# Patient Record
Sex: FEMALE | Race: WHITE | NOT HISPANIC OR LATINO | Employment: UNEMPLOYED | URBAN - METROPOLITAN AREA
[De-identification: names, ages, dates, MRNs, and addresses within clinical notes are randomized per-mention and may not be internally consistent; named-entity substitution may affect disease eponyms.]

---

## 2017-01-16 ENCOUNTER — TELEPHONE (OUTPATIENT)
Dept: URGENT CARE | Facility: CLINIC | Age: 77
End: 2017-01-16

## 2019-07-01 ENCOUNTER — LAB REQUISITION (OUTPATIENT)
Dept: LAB | Facility: HOSPITAL | Age: 79
End: 2019-07-01

## 2019-07-01 DIAGNOSIS — Z00.00 ROUTINE GENERAL MEDICAL EXAMINATION AT A HEALTH CARE FACILITY: ICD-10-CM

## 2019-07-01 LAB
INR PPP: 2.71 (ref 0.9–1.1)
PROTHROMBIN TIME: 28.5 SECONDS (ref 11.7–14.2)

## 2019-07-01 PROCEDURE — 85610 PROTHROMBIN TIME: CPT

## 2022-02-08 ENCOUNTER — LAB REQUISITION (OUTPATIENT)
Dept: LAB | Facility: HOSPITAL | Age: 82
End: 2022-02-08

## 2022-02-08 DIAGNOSIS — J96.10 CHRONIC RESPIRATORY FAILURE, UNSPECIFIED WHETHER WITH HYPOXIA OR HYPERCAPNIA: ICD-10-CM

## 2022-02-08 DIAGNOSIS — I11.0 HYPERTENSIVE HEART DISEASE WITH HEART FAILURE: ICD-10-CM

## 2022-02-08 DIAGNOSIS — J44.1 CHRONIC OBSTRUCTIVE PULMONARY DISEASE WITH (ACUTE) EXACERBATION: ICD-10-CM

## 2022-02-08 DIAGNOSIS — I50.9 HEART FAILURE, UNSPECIFIED: ICD-10-CM

## 2022-02-08 LAB
ANION GAP SERPL CALCULATED.3IONS-SCNC: 13.4 MMOL/L (ref 5–15)
BASOPHILS # BLD AUTO: 0.04 10*3/MM3 (ref 0–0.2)
BASOPHILS NFR BLD AUTO: 0.6 % (ref 0–1.5)
BUN SERPL-MCNC: 36 MG/DL (ref 8–23)
BUN/CREAT SERPL: 16.7 (ref 7–25)
CALCIUM SPEC-SCNC: 9.6 MG/DL (ref 8.6–10.5)
CHLORIDE SERPL-SCNC: 100 MMOL/L (ref 98–107)
CO2 SERPL-SCNC: 18.6 MMOL/L (ref 22–29)
CREAT SERPL-MCNC: 2.15 MG/DL (ref 0.57–1)
DEPRECATED RDW RBC AUTO: 46.1 FL (ref 37–54)
EOSINOPHIL # BLD AUTO: 0.36 10*3/MM3 (ref 0–0.4)
EOSINOPHIL NFR BLD AUTO: 5.3 % (ref 0.3–6.2)
ERYTHROCYTE [DISTWIDTH] IN BLOOD BY AUTOMATED COUNT: 13.6 % (ref 12.3–15.4)
GFR SERPL CREATININE-BSD FRML MDRD: 22 ML/MIN/1.73
GLUCOSE SERPL-MCNC: 93 MG/DL (ref 65–99)
HCT VFR BLD AUTO: 28.4 % (ref 34–46.6)
HGB BLD-MCNC: 9.1 G/DL (ref 12–15.9)
IMM GRANULOCYTES # BLD AUTO: 0.09 10*3/MM3 (ref 0–0.05)
IMM GRANULOCYTES NFR BLD AUTO: 1.3 % (ref 0–0.5)
LYMPHOCYTES # BLD AUTO: 1.63 10*3/MM3 (ref 0.7–3.1)
LYMPHOCYTES NFR BLD AUTO: 23.9 % (ref 19.6–45.3)
MCH RBC QN AUTO: 30.1 PG (ref 26.6–33)
MCHC RBC AUTO-ENTMCNC: 32 G/DL (ref 31.5–35.7)
MCV RBC AUTO: 94 FL (ref 79–97)
MONOCYTES # BLD AUTO: 0.79 10*3/MM3 (ref 0.1–0.9)
MONOCYTES NFR BLD AUTO: 11.6 % (ref 5–12)
NEUTROPHILS NFR BLD AUTO: 3.91 10*3/MM3 (ref 1.7–7)
NEUTROPHILS NFR BLD AUTO: 57.3 % (ref 42.7–76)
NRBC BLD AUTO-RTO: 0 /100 WBC (ref 0–0.2)
PLATELET # BLD AUTO: 357 10*3/MM3 (ref 140–450)
PMV BLD AUTO: 9.4 FL (ref 6–12)
POTASSIUM SERPL-SCNC: 5.2 MMOL/L (ref 3.5–5.2)
RBC # BLD AUTO: 3.02 10*6/MM3 (ref 3.77–5.28)
SODIUM SERPL-SCNC: 132 MMOL/L (ref 136–145)
WBC NRBC COR # BLD: 6.82 10*3/MM3 (ref 3.4–10.8)

## 2022-02-08 PROCEDURE — 85025 COMPLETE CBC W/AUTO DIFF WBC: CPT | Performed by: FAMILY MEDICINE

## 2022-02-08 PROCEDURE — 80048 BASIC METABOLIC PNL TOTAL CA: CPT | Performed by: FAMILY MEDICINE

## 2024-03-14 ENCOUNTER — LAB REQUISITION (OUTPATIENT)
Dept: LAB | Facility: HOSPITAL | Age: 84
End: 2024-03-14
Payer: MEDICARE

## 2024-03-14 DIAGNOSIS — I13.0 HYPERTENSIVE HEART AND CHRONIC KIDNEY DISEASE WITH HEART FAILURE AND STAGE 1 THROUGH STAGE 4 CHRONIC KIDNEY DISEASE, OR UNSPECIFIED CHRONIC KIDNEY DISEASE: ICD-10-CM

## 2024-03-14 DIAGNOSIS — J44.9 CHRONIC OBSTRUCTIVE PULMONARY DISEASE, UNSPECIFIED: ICD-10-CM

## 2024-03-14 DIAGNOSIS — D63.1 ANEMIA IN CHRONIC KIDNEY DISEASE (CODE): ICD-10-CM

## 2024-03-14 DIAGNOSIS — I50.42 CHRONIC COMBINED SYSTOLIC (CONGESTIVE) AND DIASTOLIC (CONGESTIVE) HEART FAILURE: ICD-10-CM

## 2024-03-14 DIAGNOSIS — R32 UNSPECIFIED URINARY INCONTINENCE: ICD-10-CM

## 2024-03-14 DIAGNOSIS — N39.0 URINARY TRACT INFECTION, SITE NOT SPECIFIED: ICD-10-CM

## 2024-03-14 LAB
BACTERIA UR QL AUTO: ABNORMAL /HPF
BILIRUB UR QL STRIP: NEGATIVE
CLARITY UR: CLEAR
COLOR UR: YELLOW
GLUCOSE UR STRIP-MCNC: NEGATIVE MG/DL
HGB UR QL STRIP.AUTO: NEGATIVE
HYALINE CASTS UR QL AUTO: ABNORMAL /LPF
KETONES UR QL STRIP: NEGATIVE
LEUKOCYTE ESTERASE UR QL STRIP.AUTO: NEGATIVE
NITRITE UR QL STRIP: NEGATIVE
PH UR STRIP.AUTO: 6 [PH] (ref 4.5–8)
PROT UR QL STRIP: ABNORMAL
RBC # UR STRIP: ABNORMAL /HPF
REF LAB TEST METHOD: ABNORMAL
SP GR UR STRIP: 1.02 (ref 1–1.03)
SQUAMOUS #/AREA URNS HPF: ABNORMAL /HPF
UROBILINOGEN UR QL STRIP: ABNORMAL
WBC # UR STRIP: ABNORMAL /HPF

## 2024-03-14 PROCEDURE — 81001 URINALYSIS AUTO W/SCOPE: CPT | Performed by: FAMILY MEDICINE

## 2024-08-16 ENCOUNTER — APPOINTMENT (OUTPATIENT)
Dept: NUCLEAR MEDICINE | Facility: HOSPITAL | Age: 84
DRG: 871 | End: 2024-08-16
Payer: MEDICARE

## 2024-08-16 ENCOUNTER — HOSPITAL ENCOUNTER (INPATIENT)
Facility: HOSPITAL | Age: 84
LOS: 4 days | Discharge: NURSING, REHAB OR LONG-TERM CARE | DRG: 871 | End: 2024-08-20
Attending: EMERGENCY MEDICINE | Admitting: FAMILY MEDICINE
Payer: MEDICARE

## 2024-08-16 ENCOUNTER — APPOINTMENT (OUTPATIENT)
Dept: GENERAL RADIOLOGY | Facility: HOSPITAL | Age: 84
DRG: 871 | End: 2024-08-16
Payer: MEDICARE

## 2024-08-16 DIAGNOSIS — I50.23 ACUTE ON CHRONIC SYSTOLIC CONGESTIVE HEART FAILURE: ICD-10-CM

## 2024-08-16 DIAGNOSIS — J96.02 ACUTE RESPIRATORY FAILURE WITH HYPOXIA AND HYPERCAPNIA: Primary | ICD-10-CM

## 2024-08-16 DIAGNOSIS — J18.9 HAP (HOSPITAL-ACQUIRED PNEUMONIA): ICD-10-CM
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
## 2024-08-16 DIAGNOSIS — J96.01 ACUTE RESPIRATORY FAILURE WITH HYPOXIA AND HYPERCAPNIA: Primary | ICD-10-CM

## 2024-08-16 DIAGNOSIS — J44.1 COPD EXACERBATION: ICD-10-CM

## 2024-08-16 PROBLEM — J96.90 RESPIRATORY FAILURE: Status: ACTIVE | Noted: 2024-08-16

## 2024-08-16 LAB
ALBUMIN SERPL-MCNC: 3.5 G/DL (ref 3.5–5.2)
ALBUMIN/GLOB SERPL: 1.2 G/DL
ALP SERPL-CCNC: 180 U/L (ref 39–117)
ALT SERPL W P-5'-P-CCNC: 18 U/L (ref 1–33)
ANION GAP SERPL CALCULATED.3IONS-SCNC: 8.7 MMOL/L (ref 5–15)
ARTERIAL PATENCY WRIST A: ABNORMAL
ARTERIAL PATENCY WRIST A: POSITIVE
AST SERPL-CCNC: 21 U/L (ref 1–32)
ATMOSPHERIC PRESS: 740 MMHG
ATMOSPHERIC PRESS: 740 MMHG
B PARAPERT DNA SPEC QL NAA+PROBE: NOT DETECTED
B PERT DNA SPEC QL NAA+PROBE: NOT DETECTED
BACTERIA UR QL AUTO: ABNORMAL /HPF
BASE EXCESS BLDA CALC-SCNC: -1.4 MMOL/L (ref 0–2)
BASE EXCESS BLDA CALC-SCNC: -4.6 MMOL/L (ref 0–2)
BASOPHILS # BLD AUTO: 0.1 10*3/MM3 (ref 0–0.2)
BASOPHILS NFR BLD AUTO: 0.6 % (ref 0–1.5)
BDY SITE: ABNORMAL
BDY SITE: ABNORMAL
BILIRUB SERPL-MCNC: 0.3 MG/DL (ref 0–1.2)
BILIRUB UR QL STRIP: NEGATIVE
BODY TEMPERATURE: 37
BODY TEMPERATURE: 37
BUN SERPL-MCNC: 27 MG/DL (ref 8–23)
BUN/CREAT SERPL: 21.8 (ref 7–25)
C PNEUM DNA NPH QL NAA+NON-PROBE: NOT DETECTED
CALCIUM SPEC-SCNC: 9 MG/DL (ref 8.6–10.5)
CHLORIDE SERPL-SCNC: 107 MMOL/L (ref 98–107)
CLARITY UR: CLEAR
CO2 SERPL-SCNC: 23.3 MMOL/L (ref 22–29)
COLOR UR: YELLOW
CREAT SERPL-MCNC: 1.24 MG/DL (ref 0.57–1)
D DIMER PPP FEU-MCNC: 1.83 MCGFEU/ML (ref 0–0.85)
D-LACTATE SERPL-SCNC: 0.7 MMOL/L (ref 0.5–2)
DEPRECATED RDW RBC AUTO: 65.7 FL (ref 37–54)
EGFRCR SERPLBLD CKD-EPI 2021: 42.7 ML/MIN/1.73
EOSINOPHIL # BLD AUTO: 0.8 10*3/MM3 (ref 0–0.4)
EOSINOPHIL NFR BLD AUTO: 4.6 % (ref 0.3–6.2)
EPAP: 6
ERYTHROCYTE [DISTWIDTH] IN BLOOD BY AUTOMATED COUNT: 17.8 % (ref 12.3–15.4)
FINE GRAN CASTS URNS QL MICRO: ABNORMAL /LPF
FLUAV RNA RESP QL NAA+PROBE: NOT DETECTED
FLUAV SUBTYP SPEC NAA+PROBE: NOT DETECTED
FLUBV RNA ISLT QL NAA+PROBE: NOT DETECTED
FLUBV RNA RESP QL NAA+PROBE: NOT DETECTED
GAS FLOW AIRWAY: 6 LPM
GEN 5 2HR TROPONIN T REFLEX: 120 NG/L
GLOBULIN UR ELPH-MCNC: 3 GM/DL
GLUCOSE SERPL-MCNC: 193 MG/DL (ref 65–99)
GLUCOSE UR STRIP-MCNC: NEGATIVE MG/DL
HADV DNA SPEC NAA+PROBE: NOT DETECTED
HCO3 BLDA-SCNC: 23.6 MMOL/L (ref 20–26)
HCO3 BLDA-SCNC: 24.9 MMOL/L (ref 20–26)
HCOV 229E RNA SPEC QL NAA+PROBE: NOT DETECTED
HCOV HKU1 RNA SPEC QL NAA+PROBE: NOT DETECTED
HCOV NL63 RNA SPEC QL NAA+PROBE: NOT DETECTED
HCOV OC43 RNA SPEC QL NAA+PROBE: NOT DETECTED
HCT VFR BLD AUTO: 34.3 % (ref 34–46.6)
HGB BLD-MCNC: 10.5 G/DL (ref 12–15.9)
HGB BLDA-MCNC: 10.7 G/DL (ref 13.5–17.5)
HGB BLDA-MCNC: 9.7 G/DL (ref 13.5–17.5)
HGB UR QL STRIP.AUTO: ABNORMAL
HMPV RNA NPH QL NAA+NON-PROBE: NOT DETECTED
HPIV1 RNA ISLT QL NAA+PROBE: NOT DETECTED
HPIV2 RNA SPEC QL NAA+PROBE: NOT DETECTED
HPIV3 RNA NPH QL NAA+PROBE: NOT DETECTED
HPIV4 P GENE NPH QL NAA+PROBE: NOT DETECTED
HYALINE CASTS UR QL AUTO: ABNORMAL /LPF
IMM GRANULOCYTES # BLD AUTO: 0.13 10*3/MM3 (ref 0–0.05)
IMM GRANULOCYTES NFR BLD AUTO: 0.8 % (ref 0–0.5)
INHALED O2 CONCENTRATION: 40 %
INR PPP: 1.17 (ref 0.9–1.1)
IPAP: 12
KETONES UR QL STRIP: NEGATIVE
LEUKOCYTE ESTERASE UR QL STRIP.AUTO: NEGATIVE
LYMPHOCYTES # BLD AUTO: 3.11 10*3/MM3 (ref 0.7–3.1)
LYMPHOCYTES NFR BLD AUTO: 18 % (ref 19.6–45.3)
Lab: ABNORMAL
Lab: ABNORMAL
M PNEUMO IGG SER IA-ACNC: NOT DETECTED
MCH RBC QN AUTO: 30.6 PG (ref 26.6–33)
MCHC RBC AUTO-ENTMCNC: 30.6 G/DL (ref 31.5–35.7)
MCV RBC AUTO: 100 FL (ref 79–97)
MODALITY: ABNORMAL
MODALITY: ABNORMAL
MONOCYTES # BLD AUTO: 1.34 10*3/MM3 (ref 0.1–0.9)
MONOCYTES NFR BLD AUTO: 7.7 % (ref 5–12)
NEUTROPHILS NFR BLD AUTO: 11.83 10*3/MM3 (ref 1.7–7)
NEUTROPHILS NFR BLD AUTO: 68.3 % (ref 42.7–76)
NITRITE UR QL STRIP: NEGATIVE
NOTIFIED BY: ABNORMAL
NRBC BLD AUTO-RTO: 0 /100 WBC (ref 0–0.2)
NT-PROBNP SERPL-MCNC: 2624 PG/ML (ref 0–1800)
PCO2 BLDA: 39.4 MM HG (ref 35–45)
PCO2 BLDA: 70 MM HG (ref 35–45)
PCO2 TEMP ADJ BLD: 39.4 MM HG (ref 35–45)
PCO2 TEMP ADJ BLD: 70 MM HG (ref 35–45)
PH BLDA: 7.16 PH UNITS (ref 7.35–7.45)
PH BLDA: 7.38 PH UNITS (ref 7.35–7.45)
PH UR STRIP.AUTO: 5.5 [PH] (ref 4.5–8)
PH, TEMP CORRECTED: 7.16 PH UNITS (ref 7.35–7.45)
PH, TEMP CORRECTED: 7.38 PH UNITS (ref 7.35–7.45)
PLATELET # BLD AUTO: 260 10*3/MM3 (ref 140–450)
PMV BLD AUTO: 9.4 FL (ref 6–12)
PO2 BLD: 305 MM[HG] (ref 0–500)
PO2 BLDA: 107 MM HG (ref 83–108)
PO2 BLDA: 122 MM HG (ref 83–108)
PO2 TEMP ADJ BLD: 107 MM HG (ref 83–108)
PO2 TEMP ADJ BLD: 122 MM HG (ref 83–108)
POTASSIUM SERPL-SCNC: 4.3 MMOL/L (ref 3.5–5.2)
PROCALCITONIN SERPL-MCNC: 0.22 NG/ML (ref 0–0.25)
PROT SERPL-MCNC: 6.5 G/DL (ref 6–8.5)
PROT UR QL STRIP: ABNORMAL
PROTHROMBIN TIME: 15.4 SECONDS (ref 12.1–15)
QT INTERVAL: 343 MS
QTC INTERVAL: 465 MS
RBC # BLD AUTO: 3.43 10*6/MM3 (ref 3.77–5.28)
RBC # UR STRIP: ABNORMAL /HPF
REF LAB TEST METHOD: ABNORMAL
RHINOVIRUS RNA SPEC NAA+PROBE: NOT DETECTED
RSV RNA NPH QL NAA+NON-PROBE: NOT DETECTED
SAO2 % BLDCOA: 97.4 % (ref 94–99)
SAO2 % BLDCOA: 99.5 % (ref 94–99)
SARS-COV-2 RNA NPH QL NAA+NON-PROBE: NOT DETECTED
SARS-COV-2 RNA RESP QL NAA+PROBE: NOT DETECTED
SET MECH RESP RATE: 14
SODIUM SERPL-SCNC: 139 MMOL/L (ref 136–145)
SP GR UR STRIP: 1.02 (ref 1–1.03)
SQUAMOUS #/AREA URNS HPF: ABNORMAL /HPF
TROPONIN T DELTA: 52 NG/L
TROPONIN T SERPL HS-MCNC: 68 NG/L
UROBILINOGEN UR QL STRIP: ABNORMAL
VENTILATOR MODE: ABNORMAL
WBC # UR STRIP: ABNORMAL /HPF
WBC NRBC COR # BLD AUTO: 17.31 10*3/MM3 (ref 3.4–10.8)

## 2024-08-16 PROCEDURE — 78580 LUNG PERFUSION IMAGING: CPT

## 2024-08-16 PROCEDURE — 25010000002 FUROSEMIDE PER 20 MG: Performed by: EMERGENCY MEDICINE

## 2024-08-16 PROCEDURE — 83880 ASSAY OF NATRIURETIC PEPTIDE: CPT | Performed by: EMERGENCY MEDICINE

## 2024-08-16 PROCEDURE — 85025 COMPLETE CBC W/AUTO DIFF WBC: CPT | Performed by: EMERGENCY MEDICINE

## 2024-08-16 PROCEDURE — 25010000002 PIPERACILLIN SOD-TAZOBACTAM PER 1 G: Performed by: EMERGENCY MEDICINE

## 2024-08-16 PROCEDURE — 25810000003 SODIUM CHLORIDE 0.9 % SOLUTION 1,000 ML FLEX CONT: Performed by: INTERNAL MEDICINE

## 2024-08-16 PROCEDURE — 94640 AIRWAY INHALATION TREATMENT: CPT

## 2024-08-16 PROCEDURE — 85379 FIBRIN DEGRADATION QUANT: CPT | Performed by: EMERGENCY MEDICINE

## 2024-08-16 PROCEDURE — 94799 UNLISTED PULMONARY SVC/PX: CPT

## 2024-08-16 PROCEDURE — 94761 N-INVAS EAR/PLS OXIMETRY MLT: CPT

## 2024-08-16 PROCEDURE — 80053 COMPREHEN METABOLIC PANEL: CPT | Performed by: EMERGENCY MEDICINE

## 2024-08-16 PROCEDURE — 25010000002 METHYLPREDNISOLONE PER 125 MG: Performed by: EMERGENCY MEDICINE

## 2024-08-16 PROCEDURE — 36600 WITHDRAWAL OF ARTERIAL BLOOD: CPT

## 2024-08-16 PROCEDURE — 94660 CPAP INITIATION&MGMT: CPT

## 2024-08-16 PROCEDURE — 87040 BLOOD CULTURE FOR BACTERIA: CPT | Performed by: EMERGENCY MEDICINE

## 2024-08-16 PROCEDURE — 81001 URINALYSIS AUTO W/SCOPE: CPT | Performed by: EMERGENCY MEDICINE

## 2024-08-16 PROCEDURE — 83605 ASSAY OF LACTIC ACID: CPT | Performed by: EMERGENCY MEDICINE

## 2024-08-16 PROCEDURE — 99223 1ST HOSP IP/OBS HIGH 75: CPT | Performed by: FAMILY MEDICINE

## 2024-08-16 PROCEDURE — 84145 PROCALCITONIN (PCT): CPT | Performed by: EMERGENCY MEDICINE

## 2024-08-16 PROCEDURE — 25010000002 PIPERACILLIN SOD-TAZOBACTAM PER 1 G: Performed by: INTERNAL MEDICINE

## 2024-08-16 PROCEDURE — 99291 CRITICAL CARE FIRST HOUR: CPT

## 2024-08-16 PROCEDURE — 84484 ASSAY OF TROPONIN QUANT: CPT | Performed by: EMERGENCY MEDICINE

## 2024-08-16 PROCEDURE — 36415 COLL VENOUS BLD VENIPUNCTURE: CPT

## 2024-08-16 PROCEDURE — 82803 BLOOD GASES ANY COMBINATION: CPT

## 2024-08-16 PROCEDURE — 93005 ELECTROCARDIOGRAM TRACING: CPT | Performed by: EMERGENCY MEDICINE

## 2024-08-16 PROCEDURE — 25010000002 VANCOMYCIN 750 MG RECONSTITUTED SOLUTION 1 EACH VIAL: Performed by: EMERGENCY MEDICINE

## 2024-08-16 PROCEDURE — 85610 PROTHROMBIN TIME: CPT | Performed by: EMERGENCY MEDICINE

## 2024-08-16 PROCEDURE — A9540 TC99M MAA: HCPCS | Performed by: FAMILY MEDICINE

## 2024-08-16 PROCEDURE — 0 TECHNETIUM ALBUMIN AGGREGATED: Performed by: FAMILY MEDICINE

## 2024-08-16 PROCEDURE — 93010 ELECTROCARDIOGRAM REPORT: CPT | Performed by: INTERNAL MEDICINE

## 2024-08-16 PROCEDURE — 87636 SARSCOV2 & INF A&B AMP PRB: CPT | Performed by: EMERGENCY MEDICINE

## 2024-08-16 PROCEDURE — 71045 X-RAY EXAM CHEST 1 VIEW: CPT

## 2024-08-16 PROCEDURE — 0202U NFCT DS 22 TRGT SARS-COV-2: CPT | Performed by: INTERNAL MEDICINE

## 2024-08-16 PROCEDURE — 25810000003 SODIUM CHLORIDE 0.9 % SOLUTION 250 ML FLEX CONT: Performed by: EMERGENCY MEDICINE

## 2024-08-16 PROCEDURE — 99285 EMERGENCY DEPT VISIT HI MDM: CPT

## 2024-08-16 PROCEDURE — 94664 DEMO&/EVAL PT USE INHALER: CPT

## 2024-08-16 RX ORDER — ONDANSETRON 2 MG/ML
4 INJECTION INTRAMUSCULAR; INTRAVENOUS EVERY 6 HOURS PRN
Status: DISCONTINUED | OUTPATIENT
Start: 2024-08-16 | End: 2024-08-20 | Stop reason: HOSPADM

## 2024-08-16 RX ORDER — ACETAMINOPHEN 325 MG/1
650 TABLET ORAL EVERY 6 HOURS PRN
Status: DISCONTINUED | OUTPATIENT
Start: 2024-08-16 | End: 2024-08-20 | Stop reason: HOSPADM

## 2024-08-16 RX ORDER — ALPRAZOLAM 0.25 MG
0.25 TABLET ORAL ONCE
Status: COMPLETED | OUTPATIENT
Start: 2024-08-16 | End: 2024-08-16

## 2024-08-16 RX ORDER — SODIUM CHLORIDE 0.9 % (FLUSH) 0.9 %
10 SYRINGE (ML) INJECTION EVERY 12 HOURS SCHEDULED
Status: DISCONTINUED | OUTPATIENT
Start: 2024-08-16 | End: 2024-08-20 | Stop reason: HOSPADM

## 2024-08-16 RX ORDER — PANTOPRAZOLE SODIUM 40 MG/1
40 TABLET, DELAYED RELEASE ORAL DAILY
Status: DISCONTINUED | OUTPATIENT
Start: 2024-08-16 | End: 2024-08-20 | Stop reason: HOSPADM

## 2024-08-16 RX ORDER — UBIDECARENONE 75 MG
50 CAPSULE ORAL DAILY
COMMUNITY

## 2024-08-16 RX ORDER — ALPRAZOLAM 0.25 MG
0.25 TABLET ORAL 2 TIMES DAILY PRN
Status: DISCONTINUED | OUTPATIENT
Start: 2024-08-16 | End: 2024-08-20 | Stop reason: HOSPADM

## 2024-08-16 RX ORDER — SODIUM CHLORIDE 0.9 % (FLUSH) 0.9 %
10 SYRINGE (ML) INJECTION AS NEEDED
Status: DISCONTINUED | OUTPATIENT
Start: 2024-08-16 | End: 2024-08-20 | Stop reason: HOSPADM

## 2024-08-16 RX ORDER — ALBUTEROL SULFATE 0.83 MG/ML
2.5 SOLUTION RESPIRATORY (INHALATION) ONCE
Status: COMPLETED | OUTPATIENT
Start: 2024-08-16 | End: 2024-08-16

## 2024-08-16 RX ORDER — DOCUSATE SODIUM 100 MG/1
100 CAPSULE, LIQUID FILLED ORAL NIGHTLY
Status: DISCONTINUED | OUTPATIENT
Start: 2024-08-16 | End: 2024-08-20 | Stop reason: HOSPADM

## 2024-08-16 RX ORDER — IPRATROPIUM BROMIDE AND ALBUTEROL SULFATE 2.5; .5 MG/3ML; MG/3ML
3 SOLUTION RESPIRATORY (INHALATION) ONCE
Status: COMPLETED | OUTPATIENT
Start: 2024-08-16 | End: 2024-08-16

## 2024-08-16 RX ORDER — ARFORMOTEROL TARTRATE 15 UG/2ML
15 SOLUTION RESPIRATORY (INHALATION)
Status: DISCONTINUED | OUTPATIENT
Start: 2024-08-16 | End: 2024-08-20 | Stop reason: HOSPADM

## 2024-08-16 RX ORDER — IPRATROPIUM BROMIDE AND ALBUTEROL SULFATE 2.5; .5 MG/3ML; MG/3ML
3 SOLUTION RESPIRATORY (INHALATION) EVERY 4 HOURS PRN
Status: DISCONTINUED | OUTPATIENT
Start: 2024-08-16 | End: 2024-08-20 | Stop reason: HOSPADM

## 2024-08-16 RX ORDER — ALPRAZOLAM 0.25 MG
0.25 TABLET ORAL 2 TIMES DAILY PRN
COMMUNITY

## 2024-08-16 RX ORDER — NITROGLYCERIN 0.4 MG/1
0.4 TABLET SUBLINGUAL
Status: DISCONTINUED | OUTPATIENT
Start: 2024-08-16 | End: 2024-08-19

## 2024-08-16 RX ORDER — FORMOTEROL FUMARATE DIHYDRATE 20 UG/2ML
20 SOLUTION RESPIRATORY (INHALATION)
COMMUNITY

## 2024-08-16 RX ORDER — PANTOPRAZOLE SODIUM 40 MG/1
40 TABLET, DELAYED RELEASE ORAL DAILY
COMMUNITY

## 2024-08-16 RX ORDER — FUROSEMIDE 10 MG/ML
80 INJECTION INTRAMUSCULAR; INTRAVENOUS ONCE
Status: COMPLETED | OUTPATIENT
Start: 2024-08-16 | End: 2024-08-16

## 2024-08-16 RX ORDER — BISACODYL 10 MG
10 SUPPOSITORY, RECTAL RECTAL DAILY PRN
Status: DISCONTINUED | OUTPATIENT
Start: 2024-08-16 | End: 2024-08-20 | Stop reason: HOSPADM

## 2024-08-16 RX ORDER — BACLOFEN 10 MG/1
2.5 TABLET ORAL NIGHTLY
COMMUNITY
End: 2024-08-20 | Stop reason: HOSPADM

## 2024-08-16 RX ORDER — BUDESONIDE 0.5 MG/2ML
0.5 INHALANT ORAL 2 TIMES DAILY
Status: DISCONTINUED | OUTPATIENT
Start: 2024-08-16 | End: 2024-08-20 | Stop reason: HOSPADM

## 2024-08-16 RX ORDER — CARVEDILOL 3.12 MG/1
6.25 TABLET ORAL 2 TIMES DAILY WITH MEALS
Status: DISCONTINUED | OUTPATIENT
Start: 2024-08-16 | End: 2024-08-20 | Stop reason: HOSPADM

## 2024-08-16 RX ORDER — FOLIC ACID 1 MG/1
1 TABLET ORAL DAILY
Status: DISCONTINUED | OUTPATIENT
Start: 2024-08-16 | End: 2024-08-20 | Stop reason: HOSPADM

## 2024-08-16 RX ORDER — ONDANSETRON 4 MG/1
4 TABLET, ORALLY DISINTEGRATING ORAL EVERY 6 HOURS PRN
Status: DISCONTINUED | OUTPATIENT
Start: 2024-08-16 | End: 2024-08-20 | Stop reason: HOSPADM

## 2024-08-16 RX ORDER — FERROUS SULFATE 325(65) MG
325 TABLET ORAL
COMMUNITY

## 2024-08-16 RX ORDER — BISACODYL 5 MG/1
5 TABLET, DELAYED RELEASE ORAL DAILY PRN
Status: DISCONTINUED | OUTPATIENT
Start: 2024-08-16 | End: 2024-08-20 | Stop reason: HOSPADM

## 2024-08-16 RX ORDER — METHYLPREDNISOLONE SODIUM SUCCINATE 125 MG/2ML
125 INJECTION, POWDER, LYOPHILIZED, FOR SOLUTION INTRAMUSCULAR; INTRAVENOUS ONCE
Status: COMPLETED | OUTPATIENT
Start: 2024-08-16 | End: 2024-08-16

## 2024-08-16 RX ORDER — ACETAMINOPHEN 500 MG
1000 TABLET ORAL 3 TIMES DAILY PRN
COMMUNITY

## 2024-08-16 RX ORDER — SODIUM CHLORIDE 9 MG/ML
40 INJECTION, SOLUTION INTRAVENOUS AS NEEDED
Status: DISCONTINUED | OUTPATIENT
Start: 2024-08-16 | End: 2024-08-20 | Stop reason: HOSPADM

## 2024-08-16 RX ORDER — AMOXICILLIN 250 MG
2 CAPSULE ORAL 2 TIMES DAILY
Status: DISCONTINUED | OUTPATIENT
Start: 2024-08-16 | End: 2024-08-20 | Stop reason: HOSPADM

## 2024-08-16 RX ORDER — FOLIC ACID 1 MG/1
1 TABLET ORAL DAILY
COMMUNITY

## 2024-08-16 RX ORDER — ALLOPURINOL 100 MG/1
200 TABLET ORAL DAILY
Status: DISCONTINUED | OUTPATIENT
Start: 2024-08-16 | End: 2024-08-20 | Stop reason: HOSPADM

## 2024-08-16 RX ORDER — BUDESONIDE 0.5 MG/2ML
0.5 INHALANT ORAL 2 TIMES DAILY
COMMUNITY

## 2024-08-16 RX ORDER — UBIDECARENONE 75 MG
50 CAPSULE ORAL DAILY
Status: DISCONTINUED | OUTPATIENT
Start: 2024-08-16 | End: 2024-08-18

## 2024-08-16 RX ORDER — GUAIFENESIN 600 MG/1
1200 TABLET, EXTENDED RELEASE ORAL 2 TIMES DAILY
Status: DISCONTINUED | OUTPATIENT
Start: 2024-08-16 | End: 2024-08-20 | Stop reason: HOSPADM

## 2024-08-16 RX ORDER — ZINC OXIDE
1-2 OINTMENT (GRAM) TOPICAL 2 TIMES DAILY
COMMUNITY
End: 2024-08-20 | Stop reason: HOSPADM

## 2024-08-16 RX ORDER — ALLOPURINOL 100 MG/1
200 TABLET ORAL DAILY
COMMUNITY

## 2024-08-16 RX ORDER — CARVEDILOL 6.25 MG/1
6.25 TABLET ORAL 2 TIMES DAILY WITH MEALS
COMMUNITY

## 2024-08-16 RX ORDER — POLYETHYLENE GLYCOL 3350 17 G/17G
17 POWDER, FOR SOLUTION ORAL DAILY PRN
Status: DISCONTINUED | OUTPATIENT
Start: 2024-08-16 | End: 2024-08-20 | Stop reason: HOSPADM

## 2024-08-16 RX ORDER — DOCUSATE SODIUM 100 MG/1
100 CAPSULE, LIQUID FILLED ORAL NIGHTLY
COMMUNITY

## 2024-08-16 RX ORDER — FUROSEMIDE 20 MG
20 TABLET ORAL DAILY
Status: DISCONTINUED | OUTPATIENT
Start: 2024-08-16 | End: 2024-08-18

## 2024-08-16 RX ORDER — LOPERAMIDE HCL 2 MG
2 CAPSULE ORAL 4 TIMES DAILY PRN
COMMUNITY
End: 2024-08-20 | Stop reason: HOSPADM

## 2024-08-16 RX ORDER — FUROSEMIDE 20 MG
20 TABLET ORAL DAILY
COMMUNITY
End: 2024-08-20 | Stop reason: HOSPADM

## 2024-08-16 RX ORDER — IPRATROPIUM BROMIDE AND ALBUTEROL SULFATE 2.5; .5 MG/3ML; MG/3ML
3 SOLUTION RESPIRATORY (INHALATION) EVERY 6 HOURS PRN
Status: DISCONTINUED | OUTPATIENT
Start: 2024-08-16 | End: 2024-08-16

## 2024-08-16 RX ORDER — IPRATROPIUM BROMIDE AND ALBUTEROL SULFATE 2.5; .5 MG/3ML; MG/3ML
3 SOLUTION RESPIRATORY (INHALATION)
Status: DISCONTINUED | OUTPATIENT
Start: 2024-08-16 | End: 2024-08-16

## 2024-08-16 RX ORDER — FERROUS SULFATE 325(65) MG
325 TABLET ORAL
Status: DISCONTINUED | OUTPATIENT
Start: 2024-08-16 | End: 2024-08-20 | Stop reason: HOSPADM

## 2024-08-16 RX ORDER — LABETALOL HYDROCHLORIDE 5 MG/ML
20 INJECTION, SOLUTION INTRAVENOUS EVERY 6 HOURS PRN
Status: DISCONTINUED | OUTPATIENT
Start: 2024-08-16 | End: 2024-08-20 | Stop reason: HOSPADM

## 2024-08-16 RX ORDER — AMLODIPINE BESYLATE 5 MG/1
5 TABLET ORAL DAILY
Status: DISCONTINUED | OUTPATIENT
Start: 2024-08-16 | End: 2024-08-20 | Stop reason: HOSPADM

## 2024-08-16 RX ADMIN — FOLIC ACID 1 MG: 1 TABLET ORAL at 09:15

## 2024-08-16 RX ADMIN — ACETAMINOPHEN 650 MG: 325 TABLET ORAL at 09:39

## 2024-08-16 RX ADMIN — PANTOPRAZOLE SODIUM 40 MG: 40 TABLET, DELAYED RELEASE ORAL at 09:07

## 2024-08-16 RX ADMIN — SODIUM CHLORIDE 40 ML: 9 INJECTION, SOLUTION INTRAVENOUS at 13:59

## 2024-08-16 RX ADMIN — PIPERACILLIN SODIUM AND TAZOBACTAM SODIUM 3.38 G: 3; .375 INJECTION, POWDER, LYOPHILIZED, FOR SOLUTION INTRAVENOUS at 02:09

## 2024-08-16 RX ADMIN — APIXABAN 2.5 MG: 2.5 TABLET, FILM COATED ORAL at 21:14

## 2024-08-16 RX ADMIN — CARVEDILOL 6.25 MG: 3.12 TABLET, FILM COATED ORAL at 17:11

## 2024-08-16 RX ADMIN — APIXABAN 2.5 MG: 2.5 TABLET, FILM COATED ORAL at 09:15

## 2024-08-16 RX ADMIN — IPRATROPIUM BROMIDE AND ALBUTEROL SULFATE 3 ML: .5; 3 SOLUTION RESPIRATORY (INHALATION) at 08:10

## 2024-08-16 RX ADMIN — VITAM B12 50 MCG: 100 TAB at 09:15

## 2024-08-16 RX ADMIN — BUDESONIDE 0.5 MG: 0.5 INHALANT RESPIRATORY (INHALATION) at 20:14

## 2024-08-16 RX ADMIN — PIPERACILLIN SODIUM AND TAZOBACTAM SODIUM 3.38 G: 3; .375 INJECTION, POWDER, LYOPHILIZED, FOR SOLUTION INTRAVENOUS at 09:50

## 2024-08-16 RX ADMIN — GUAIFENESIN 1200 MG: 600 TABLET, EXTENDED RELEASE ORAL at 21:14

## 2024-08-16 RX ADMIN — ALBUTEROL SULFATE 2.5 MG: 2.5 SOLUTION RESPIRATORY (INHALATION) at 22:13

## 2024-08-16 RX ADMIN — FUROSEMIDE 80 MG: 10 INJECTION, SOLUTION INTRAMUSCULAR; INTRAVENOUS at 01:59

## 2024-08-16 RX ADMIN — ARFORMOTEROL TARTRATE 15 MCG: 15 SOLUTION RESPIRATORY (INHALATION) at 11:51

## 2024-08-16 RX ADMIN — PIPERACILLIN SODIUM AND TAZOBACTAM SODIUM 3.38 G: 3; .375 INJECTION, POWDER, LYOPHILIZED, FOR SOLUTION INTRAVENOUS at 17:11

## 2024-08-16 RX ADMIN — AMLODIPINE BESYLATE 5 MG: 5 TABLET ORAL at 09:14

## 2024-08-16 RX ADMIN — BUDESONIDE 0.5 MG: 0.5 INHALANT RESPIRATORY (INHALATION) at 11:51

## 2024-08-16 RX ADMIN — GUAIFENESIN 1200 MG: 600 TABLET, EXTENDED RELEASE ORAL at 09:14

## 2024-08-16 RX ADMIN — FUROSEMIDE 20 MG: 20 TABLET ORAL at 09:15

## 2024-08-16 RX ADMIN — ARFORMOTEROL TARTRATE 15 MCG: 15 SOLUTION RESPIRATORY (INHALATION) at 20:14

## 2024-08-16 RX ADMIN — Medication 10 ML: at 09:15

## 2024-08-16 RX ADMIN — ACETAMINOPHEN 650 MG: 325 TABLET ORAL at 21:14

## 2024-08-16 RX ADMIN — FERROUS SULFATE TAB 325 MG (65 MG ELEMENTAL FE) 325 MG: 325 (65 FE) TAB at 09:15

## 2024-08-16 RX ADMIN — ALPRAZOLAM 0.25 MG: 0.25 TABLET ORAL at 22:05

## 2024-08-16 RX ADMIN — ALPRAZOLAM 0.25 MG: 0.25 TABLET ORAL at 21:14

## 2024-08-16 RX ADMIN — VANCOMYCIN HYDROCHLORIDE 750 MG: 750 INJECTION, POWDER, LYOPHILIZED, FOR SOLUTION INTRAVENOUS at 02:22

## 2024-08-16 RX ADMIN — IPRATROPIUM BROMIDE AND ALBUTEROL SULFATE 3 ML: .5; 3 SOLUTION RESPIRATORY (INHALATION) at 02:16

## 2024-08-16 RX ADMIN — CARVEDILOL 6.25 MG: 3.12 TABLET, FILM COATED ORAL at 09:15

## 2024-08-16 RX ADMIN — IPRATROPIUM BROMIDE AND ALBUTEROL SULFATE 3 ML: .5; 3 SOLUTION RESPIRATORY (INHALATION) at 01:29

## 2024-08-16 RX ADMIN — IPRATROPIUM BROMIDE AND ALBUTEROL SULFATE 3 ML: .5; 3 SOLUTION RESPIRATORY (INHALATION) at 04:15

## 2024-08-16 RX ADMIN — METHYLPREDNISOLONE SODIUM SUCCINATE 125 MG: 125 INJECTION, POWDER, FOR SOLUTION INTRAMUSCULAR; INTRAVENOUS at 01:44

## 2024-08-16 RX ADMIN — SENNOSIDES AND DOCUSATE SODIUM 1 TABLET: 50; 8.6 TABLET ORAL at 09:07

## 2024-08-16 RX ADMIN — ALLOPURINOL 200 MG: 100 TABLET ORAL at 09:15

## 2024-08-16 RX ADMIN — KIT FOR THE PREPARATION OF TECHNETIUM TC 99M ALBUMIN AGGREGATED 1 DOSE: 2.5 INJECTION, POWDER, FOR SOLUTION INTRAVENOUS at 06:06

## 2024-08-16 RX ADMIN — Medication 10 ML: at 21:17

## 2024-08-16 NOTE — CASE MANAGEMENT/SOCIAL WORK
Continued Stay Note  KOJO Martin     Patient Name: Alberta Flores  MRN: [id number]  Today's Date: 8/16/2024    Admit Date: 8/16/2024    Plan: plan return to [facility]   Discharge Plan       Row Name 08/16/24 6701       Plan    Plan plan return to [facility]    Patient/Family in Agreement with Plan yes    Plan Comments Spoke with patient at bedside earlier this morning, patient was sitting up in bed eating breakfast and agreeable to speak with CM. Patient confirms she lives at [facility], but unable to give additional information. Permission  for CM to speak with her daughter, Debby Dominguez. Spoke with Debby via phone. She verifies patient lives at [facility] unit and will return there at [place]. She states her mother needs assistance with all ADLs and mostly uses a wc for mobility. She requests a call to update when patient is ready for [place]. Sticky note placed for staff to notify daughter @ [facility]. Debby states she is POA and will email POA paper work to this CM to be scanned to medical record. Spoke with [facility] who states patient can return to [facility] unit -private pay at [place]. Please contact [facility] at [facility]. CM will continue to follow.                   Discharge Codes    No documentation.                       Giacomo De La Torre RN

## 2024-08-16 NOTE — PROGRESS NOTES
Patient was transferred to ICU via nasal cannula, stable at present time.  BIPAP was set up in room. Dr. Tee assessed patient and well aware of her history. Said she was okay to wear her nasal cannula for the rest of this night as is her home regime.  He is okay to leave bipap set up in case patient condition worsened and she needed NPPV support. She does not want to be intubated but was agreeable to wear the bipap.I gave her another nebulizer treatment, discussed her copd diagnosis and pursed lip breathing techniques.  She is currently stable on 4 lpm on a nasal cannula. Her vitals are stable at present time and she is sleeping comfortably.

## 2024-08-16 NOTE — H&P
[facility]ITAL[facility]     PCP: Karsten Caldera MD    CODE status: DNR    CHIEF COMPLAINT: found obtunded at facility     HISTORY OF PRESENT ILLNESS:    85 y/o female with hx of COPD, HTN, GERD, and HFpEF, presents to [facility] [place] after being found unresponsive.  Per EMS, she wears 3 L oxygen at baseline. She was recently admitted to [facility] per chart review for acute COPD exacerbation.  In the ED, she was noted to be hypoxic and hypercarbic.  She was placed on BIPAP and given IV steroids, duoneb x 2, and IV lasix.  She is now more alert on oxygen.  She denies any chest or abdominal pain or nausea.  She is too confused to answer any further questions at this time.           Past Medical History:   Diagnosis Date    Asthma     CHF (congestive heart failure)     COPD (chronic obstructive pulmonary disease)     GERD (gastroesophageal reflux disease)     Hypertension      No past surgical history on file.  No family history on file.  Social History     Tobacco Use    Smoking status: Never   Substance Use Topics    Alcohol use: No     (Not in a hospital admission)    Allergies:  Iodine, Fluticasone, Sulfa antibiotics, Acetaminophen-codeine, Ceftin [cefuroxime axetil], Codeine, Fluconazole, Fluocinolone, Fluocinolone acetonide, Nuts, Tylenol [acetaminophen], Vioxx [rofecoxib], Atorvastatin, Enoxaparin, and Shellfish allergy      There is no immunization history on file for this patient.    REVIEW OF SYSTEMS:  Please see the above history of present illness for pertinent positives and negatives.  The remainder of the patient's systems have been reviewed and are negative.     Vital Signs  Temp:  [99.7 °F (37.6 °C)-100.3 °F (37.9 °C)] 100.3 °F (37.9 °C)  Heart Rate:  [] 84  Resp:  [28-35] 28  BP: (192)/(86) 192/86  Flowsheet Rows      Flowsheet Row First Filed Value   Admission Height --   Admission Weight 43.1 kg (95 lb) Documented at 08/16/2024 0108                Physical Exam:  General: Elderly female; lying in bed; on oxygen; confused   HENT: Head is atraumatic, normocephalic. Hearing is grossly intact.   Eyes: Vision is grossly intact. Pupils appear equal and round.   Cardiovascular: Irregularly irregular rhythm; Controlled HR  Respiratory: Coarse and diminished breath sounds bilaterally; occasional wheeze   Abdominal/GI: Soft, nontender, bowel sounds present. No rebound or guarding present.   Extremities: No peripheral edema noted.   Musculoskeletal: Spontaneous movement of bilateral upper and lower extremities against gravity noted. No signs of injury or deformity noted.   Skin: Warm and dry.   Neuro: No facial asymmetry noted. No focal deficits noted, hearing and vision are grossly intact.           Results Review:    I reviewed the patient's new clinical results.  Lab Results (most recent)       Procedure Component Value Units Date/Time    Urinalysis, Microscopic Only - Urine, Clean Catch [[id number]]  (Abnormal) Collected: 08/16/24 0159    Specimen: Urine, Clean Catch Updated: 08/16/24 0232     RBC, UA 3-5 /HPF      WBC, UA 0-2 /HPF      Bacteria, UA Trace /HPF      Squamous Epithelial Cells, UA 3-6 /HPF      Hyaline Casts, UA 7-12 /LPF      Fine Granular Casts, UA 0-2 /LPF      Methodology Manual Light Microscopy    COVID-19 and FLU A/B PCR, 1 HR TAT - Swab, Nasopharynx [[id number]]  (Normal) Collected: 08/16/24 0200    Specimen: Swab from Nasopharynx Updated: 08/16/24 0227     COVID19 Not Detected     Influenza A PCR Not Detected     Influenza B PCR Not Detected    Narrative:      Fact sheet for providers: https://www.fda.gov/media/694669/download    Fact sheet for patients: https://www.fda.gov/media/003430/download    Test performed by PCR.    Urinalysis With Microscopic If Indicated (No Culture) - Urine, Clean Catch [[id number]]  (Abnormal) Collected: 08/16/24 0159    Specimen: Urine, Clean Catch Updated: 08/16/24 0220     Color, UA Yellow     Appearance, UA  Clear     pH, UA 5.5     Specific Gravity, UA 1.019     Comment: Result obtained by Refractometer        Glucose, UA Negative     Ketones, UA Negative     Bilirubin, UA Negative     Blood, UA Moderate (2+)     Protein, UA >=300 mg/dL (3+)     Leuk Esterase, UA Negative     Nitrite, UA Negative     Urobilinogen, UA 0.2 E.U./dL    Single High Sensitivity Troponin T [[id number]]  (Abnormal) Collected: 08/16/24 0136    Specimen: Blood Updated: 08/16/24 0220     HS Troponin T 68 ng/L     Narrative:      High Sensitive Troponin T Reference Range:  <14.0 ng/L- Negative Female for AMI  <22.0 ng/L- Negative Male for AMI  >=14 - Abnormal Female indicating possible myocardial injury.  >=22 - Abnormal Male indicating possible myocardial injury.   Clinicians would have to utilize clinical acumen, EKG, Troponin, and serial changes to determine if it is an Acute Myocardial Infarction or myocardial injury due to an underlying chronic condition.         Comprehensive Metabolic Panel [[id number]]  (Abnormal) Collected: 08/16/24 0136    Specimen: Blood Updated: 08/16/24 0208     Glucose 193 mg/dL      BUN 27 mg/dL      Creatinine 1.24 mg/dL      Sodium 139 mmol/L      Potassium 4.3 mmol/L      Chloride 107 mmol/L      CO2 23.3 mmol/L      Calcium 9.0 mg/dL      Total Protein 6.5 g/dL      Albumin 3.5 g/dL      ALT (SGPT) 18 U/L      AST (SGOT) 21 U/L      Alkaline Phosphatase 180 U/L      Total Bilirubin 0.3 mg/dL      Globulin 3.0 gm/dL      A/G Ratio 1.2 g/dL      BUN/Creatinine Ratio 21.8     Anion Gap 8.7 mmol/L      eGFR 42.7 mL/min/1.73     Narrative:      GFR Normal >60  Chronic Kidney Disease <60  Kidney Failure <15    The GFR formula is only valid for adults with stable renal function between ages 18 and 70.    Procalcitonin [[id number]]  (Normal) Collected: 08/16/24 0136    Specimen: Blood Updated: 08/16/24 0208     Procalcitonin 0.22 ng/mL     Narrative:      As a Marker for Sepsis (Non-Neonates):    1. <0.5 ng/mL represents a  "low risk of severe sepsis and/or septic shock.  2. >2 ng/mL represents a high risk of severe sepsis and/or septic shock.    As a Marker for Lower Respiratory Tract Infections that require antibiotic therapy:    PCT on Admission    Antibiotic Therapy       6-12 Hrs later    >0.5                Strongly Recommended  >0.25 - <0.5        Recommended   0.1 - 0.25          Discouraged              Remeasure/reassess PCT  <0.1                Strongly Discouraged     Remeasure/reassess PCT    As 28 day mortality risk marker: \"Change in Procalcitonin Result\" (>80% or <=80%) if Day 0 (or Day 1) and Day 4 values are available. Refer to http://www.[facility] [place], [place]lyticspct-calculator.com    Change in PCT <=80%  A decrease of PCT levels below or equal to 80% defines a positive change in PCT test result representing a higher risk for 28-day all-cause mortality of patients diagnosed with severe sepsis for septic shock.    Change in PCT >80%  A decrease of PCT levels of more than 80% defines a negative change in PCT result representing a lower risk for 28-day all-cause mortality of patients diagnosed with severe sepsis or septic shock.       BNP [[id number]]  (Abnormal) Collected: 08/16/24 0136    Specimen: Blood Updated: 08/16/24 0208     proBNP 2,624.0 pg/mL     Narrative:      This assay is used as an aid in the diagnosis of individuals suspected of having heart failure. It can be used as an aid in the diagnosis of acute decompensated heart failure (ADHF) in patients presenting with signs and symptoms of ADHF to the emergency department (ED). In addition, NT-proBNP of <300 pg/mL indicates ADHF is not likely.    Age Range Result Interpretation  NT-proBNP Concentration (pg/mL:      <50             Positive            >450                   Gray                 300-450                    Negative             <300    50-75           Positive            >900                  Gray                300-900                  Negative            " "<300      >75             Positive            >1800                  Gray                300-1800                  Negative            <300    Blood Culture - Blood, Arm, Right [[id number]] Collected: 08/16/24 0200    Specimen: Blood from Arm, Right Updated: 08/16/24 0205    Lactic Acid, Plasma [[id number]]  (Normal) Collected: 08/16/24 0136    Specimen: Blood Updated: 08/16/24 0200     Lactate 0.7 mmol/L     D-dimer, Quantitative [[id number]]  (Abnormal) Collected: 08/16/24 0136    Specimen: Blood Updated: 08/16/24 0158     D-Dimer, Quantitative 1.83 MCGFEU/mL     Narrative:      According to the assay 's published package insert, a normal (<0.50 MCGFEU/mL) D-dimer result in conjunction with a non-high clinical probability assessment, excludes deep vein thrombosis (DVT) and pulmonary embolism (PE) with high sensitivity.    D-dimer values increase with age and this can make VTE exclusion of an older population difficult. To address this, the American College of [employer], based on best available evidence and recent guidelines, recommends that clinicians use age-adjusted D-dimer thresholds in patients greater than 50 years of age with: a) a low probability of PE who do not meet all Pulmonary Embolism Rule Out Criteria, or b) in those with intermediate probability of PE.   The formula for an age-adjusted D-dimer cut-off is \"age/100\".  For example, a 60 year old patient would have an age-adjusted cut-off of 0.60 MCGFEU/mL and an 80 year old 0.80 MCGFEU/mL.    Protime-INR [[id number]]  (Abnormal) Collected: 08/16/24 0136    Specimen: Blood Updated: 08/16/24 0158     Protime 15.4 Seconds      INR 1.17    Narrative:      Therapeutic Ranges for INR: 2.0-3.0 (PT 20-30)                              2.5-3.5 (PT 25-34)    Blood Gas, Arterial - [[id number]]  (Abnormal) Collected: 08/16/24 0135    Specimen: Arterial Blood Updated: 08/16/24 0148     Site Left Brachial     Kt's Test N/A     pH, Arterial 7.159 pH units  "     Comment: 85 Value below critical limit        pCO2, Arterial 70.0 mm Hg      Comment: 86 Value above critical limit        pO2, Arterial 107.0 mm Hg      HCO3, Arterial 24.9 mmol/L      Base Excess, Arterial -4.6 mmol/L      Comment: 84 Value below reference range        O2 Saturation, Arterial 97.4 %      Hemoglobin, Blood Gas 10.7 g/dL      Comment: 84 Value below reference range        Temperature 37.0     Barometric Pressure for Blood Gas 740 mmHg      Modality Aerosol Mask     Flow Rate 6.0 lpm      Notified By [id number]     Collected by [id number]     Comment: Meter: [id number]     :  [id number]        pCO2, Temperature Corrected 70.0 mm Hg      pH, Temp Corrected 7.159 pH Units      pO2, Temperature Corrected 107 mm Hg     CBC & Differential [[id number]]  (Abnormal) Collected: 08/16/24 0136    Specimen: Blood Updated: 08/16/24 0147    Narrative:      The following orders were created for panel order CBC & Differential.  Procedure                               Abnormality         Status                     ---------                               -----------         ------                     CBC Auto Differential[[id number]]        Abnormal            Final result                 Please view results for these tests on the individual orders.    CBC Auto Differential [[id number]]  (Abnormal) Collected: 08/16/24 0136    Specimen: Blood Updated: 08/16/24 0147     WBC 17.31 10*3/mm3      RBC 3.43 10*6/mm3      Hemoglobin 10.5 g/dL      Hematocrit 34.3 %      .0 fL      MCH 30.6 pg      MCHC 30.6 g/dL      RDW 17.8 %      RDW-SD 65.7 fl      MPV 9.4 fL      Platelets 260 10*3/mm3      Neutrophil % 68.3 %      Lymphocyte % 18.0 %      Monocyte % 7.7 %      Eosinophil % 4.6 %      Basophil % 0.6 %      Immature Grans % 0.8 %      Neutrophils, Absolute 11.83 10*3/mm3      Lymphocytes, Absolute 3.11 10*3/mm3      Monocytes, Absolute 1.34 10*3/mm3      Eosinophils, Absolute 0.80 10*3/mm3      Basophils,  Absolute 0.10 10*3/mm3      Immature Grans, Absolute 0.13 10*3/mm3      nRBC 0.0 /100 WBC     Blood Culture - Blood, Arm, Left [[id number]] Collected: 08/16/24 0136    Specimen: Blood from Arm, Left Updated: 08/16/24 0141            Imaging Results (Most Recent)       Procedure Component Value Units Date/Time    XR Chest 1 View [[id number]] Collected: 08/16/24 0126     Updated: 08/16/24 0135    Narrative:      XR CHEST 1 VW    Date of Exam: 8/16/2024 1:15 AM EDT    Indication: Short of air    Comparison: None available.    Findings:  The heart is enlarged. There are coarse interstitial and alveolar densities seen throughout both lungs slightly greater on the right than the left and greatest in the right upper lobe and lateral basilar right lower lobe. These findings are most   consistent with multifocal pneumonia though a background of chronic interstitial disease is also probably present. There is emphysema. The osseous structures are normal.      Impression:      Impression:  Multifocal pneumonia.        Electronically Signed: Silvestre Thompson MD    8/16/2024 1:27 AM EDT    Workstation ID: [id number]          reviewed    ECG/EMG Results (most recent)       Procedure Component Value Units Date/Time    ECG 12 Lead Dyspnea [[id number]] Collected: 08/16/24 0113     Updated: 08/16/24 0118     QT Interval 343 ms      QTC Interval 465 ms     Narrative:      HEART IYON=678  bpm  RR Kpeftrae=666  ms  ME Jncbvpsl=106  ms  P Horizontal Axis=174  deg  P Front Axis=201  deg  QRSD Bygyxfrl=725  ms  QT Vtbqbcvz=207  ms  GWzN=657  ms  QRS Axis=-53  deg  T Wave Axis=127  deg  - ABNORMAL ECG -  Sinus or ectopic atrial tachycardia  IVCD, consider RBBB  LVH with secondary repolarization abnormality  ST elevation secondary to IVCD  Date and Time of Study:2024-08-16 01:13:44              Assessment & Plan     Acute on chronic hypoxic and hypercarbic respiratory failure with sepsis, POA  Likely secondary to PNA/COPD exacerbation   WBC 17 K   ABG  showed CO2 of 70 with acidosis  Was on BIPAP in ED; now more alert; ok to continue oxygen via NC for now   CXR reviewed; suspect PNA; had recent hospital admission earlier this month at [facility]  Txt as HCAP; start IV Zosyn and Vancomycin   Duonebs q 6h scheduled and prn; hold on any additional steroids at this time   Wean to home rate of 3 L oxygen NC as tolerated      Atrial fibrillation with RVR  Rate better controlled  Continue Coreg and Eliquis   Monitor     HTN  BP elevated in ED, but now improved   Resume home BP meds once reconciled   Prn IV labetalol for systolic >170      Chronic HFpEF  BNP elevated, but no signs of significant overload  Given IV lasix in ED x 1; hold on any additional diuresis for now  Resume home diuretics once meds reconciled     CKD III  Creatinine near baseline at 1.24 per chart review  Monitor closely     DVT ppx:  Eliquis    Patient will be admitted to ICU overnight for close monitoring of her respiratory status and HR.      Code status:  DNR/DNI            Jalil Tee DO  08/16/24  03:04 EDT        At [facility], we believe that sharing information builds trust and better relationships. You are receiving this note because you recently visited [facility]. It is possible you will see health information before a provider has talked with you about it. This kind of information can be easy to misunderstand. To help you fully understand what it means for your health, we urge you to discuss this note with your provider.

## 2024-08-16 NOTE — PLAN OF CARE
Goal Outcome Evaluation:      Pt new early this morning from LTC. Pt was originally on bipap but she was weaned to 3L/NC. This is the patients home baseline o2 amount. Pt has tolerated this level well today, even with sleeping. New ABG shows improvement than one taken previously in shift. Pt has ambulated at bedside with staff, ate well and slept on and off throughout the shift. Pt to be transitioned to medsurg later this afternoon once she wakes up from her nap. Pts VSS and no complaints or issues at this time.

## 2024-08-16 NOTE — NURSING NOTE
Spoke with daughter, Debby this AM and updated on moms POC. Debby states she will fax us her POA paperwork to ICU. Awaiting fax at this time. Will add to soft chart once arrived.

## 2024-08-16 NOTE — PROGRESS NOTES
Called to ED for ABG and duo neb treatments.  Patient lethargic and unresponsive.  ABG' resulted in   BIPAP requirement.  Patient not able to consent to intubation/ family could not be reached.  Placed on ST mode on V60 at 12/6 with 40 % oxygen.  After treatments with medications patient became much more alert and could answer questions appropriately.  Refused BIPAP at first attempt but retried 20 mins later and patient complied.  She presents now resting comfortably with  PIP 12 and VE 11.5, rate of 22 96% 02 Sats on 40%. Will reassess in 2 hours.

## 2024-08-16 NOTE — ED PROVIDER NOTES
Subjective   History of Present Illness    Chief complaint: Short of air    Location: Noted at nursing    Quality/Severity: Severe    Timing/Onset/Duration: Worse tonight    Modifying Factors: Not improved with oxygen    Associated Symptoms: Patient unable to give associated symptoms    Narrative: This 84-year-old white female with history of COPD, hypertension, and CHF, and atrial fibrillation, presents with shortness of breath from the [place].  The patient was admitted on August 2, 2024 at [facility] with acute on chronic respiratory failure with hypoxia and hypercapnia.  According to the paramedics, the patient is on 3 L of oxygen at the nursing home.  The patient is on Eliquis.    PCP:    Review of Systems   Unable to perform ROS: Acuity of condition         Past Medical History:   Diagnosis Date   • Asthma    • CHF (congestive heart failure)    • COPD (chronic obstructive pulmonary disease)    • GERD (gastroesophageal reflux disease)    • Hypertension        Allergies   Allergen Reactions   • Ceftin [Cefuroxime Axetil]    • Codeine    • Tylenol [Acetaminophen]    • Vioxx [Rofecoxib]        No past surgical history on file.    No family history on file.    Social History     Socioeconomic History   • Marital status: Single   Tobacco Use   • Smoking status: Never   Substance and Sexual Activity   • Alcohol use: No           Objective   Physical Exam  Vitals (Temperature 99.7 °F, pulse 107, respirations 28, /86, saturations 92% on simple facemask.) and nursing note reviewed.   Constitutional:       Comments: Patient appears somnolent and in severe respiratory distress   HENT:      Head: Normocephalic and atraumatic.      Right Ear: Tympanic membrane normal.      Left Ear: Tympanic membrane normal.      Nose: Nose normal.      Mouth/Throat:      Mouth: Mucous membranes are moist.   Eyes:      Pupils: Pupils are equal, round, and reactive to light.   Cardiovascular:      Rate and Rhythm: Tachycardia present.       Pulses: Normal pulses.      Heart sounds: Murmur heard.      No friction rub. No gallop.   Pulmonary:      Effort: Respiratory distress present.      Breath sounds: Rales present.   Abdominal:      General: Abdomen is flat. Bowel sounds are normal. There is no distension.      Palpations: Abdomen is soft. There is no mass.      Tenderness: There is no abdominal tenderness. There is no right CVA tenderness, left CVA tenderness, guarding or rebound.      Hernia: No hernia is present.   Musculoskeletal:         General: Swelling present. No tenderness. Normal range of motion.      Cervical back: Normal range of motion and neck supple. No rigidity.      Right lower leg: Edema (3+ pitting) present.      Left lower leg: Edema (3+ pitting) present.   Skin:     General: Skin is warm and dry.   Neurological:      Comments: Patient localizes pain.  Pulses are equal and round and reactive.       Procedures           ED Course  ED Course as of 08/16/24 0222   Fri Aug 16, 2024   0143 The pH is 7.15, pCO2 70, pO2 107, bicarb 24, saturation is 97% on simple facemask. [RC]   0148 The white blood cell count is 17,000, hemoglobin 10.5, hematocrit 34, platelet count 260,000, absolute neutrophil count 11.8.  The CBC is otherwise unremarkable. [RC]   0148 10 days ago the hemoglobin is 8.7, hemoglobin has improved today.  White blood cell count was 5.18, the white blood cell count is higher tonight. [RC]   0158 D-dimer is 1.83.  This is elevated. [RC]   0159 The INR is 1.17, PT is 15. [RC]   0201 The lactate is 0.7 and normal [RC]   0209 The proBNP is 2624 and elevated. [RC]   0209 The glucose is 193, BUN is 27, creatinine is 1.24, alk phos is 180, GFR is 42.7.  CMP is otherwise unremarkable [RC]   0213 Procalcitonin is 0.22. [RC]   0220 The high-sensitivity troponin is 68. [RC]      ED Course User Index  [RC] Allen Russ MD      01:17 EDT, 08/16/24:  The EKG was obtained at 113 and read by me at 113.  The EKG shows a sinus  tachycardia with a rate of 111.  There is a right bundle branch block and a ventricular conduction delay.  There is left ventricular hypertrophy with secondary repolarization abnormalities.  There is ST elevation related to intraventricular conduction delay.  Patient has no old EKG available for comparison.                           01:46 EDT, 08/16/24:  Multiple attempts have been made to contact the patient's medical power of .  There is been no return call.  The patient is in acute hypoxic and hyper cardiac respiratory failure.  BiPAP will be placed on the patient.    02:02 EDT, 08/16/24:  The patient is more awake and alert.  She does not want a breathing tube.    02:23 EDT, 08/16/24:  Dr. Tee, on-call for the hospitalist, has been paged out.  The plan will be to admit the patient to the ICU for acute respiratory failure, hypoxic and hypercapnic with CHF, COPD exacerbation, and pneumonia.  Dr. Tee returned call.  He will admit the patient to the ICU.    02:25 EDT, 08/16/24:  Total critical care time spent on this patient exclusive of separately billable procedures is 35 minutes.          Medical Decision Making      Final diagnoses:   Acute respiratory failure with hypoxia and hypercapnia   HAP (hospital-acquired pneumonia)   Acute on chronic systolic congestive heart failure   COPD exacerbation       ED Disposition  ED Disposition       None            No follow-up provider specified.       Medication List      No changes were made to your prescriptions during this visit.       No orders to display     Labs Reviewed - No data to display  Duplex US Venous Ext Low Bilateral    Result Date: 8/7/2024  Narrative: REVIEWING YOUR TEST RESULTS IN [facility] IS NOT A SUBSTITUTE FOR DISCUSSING THOSE RESULTS WITH YOUR HEALTH CARE PROVIDER. PLEASE CONTACT YOUR PROVIDER VIA [facility] TO DISCUSS ANY QUESTIONS OR CONCERNS YOU MAY HAVE REGARDING THESE TEST RESULTS.  RADIOLOGY REPORT FACILITY: [place]  [facility] AGE/GENDER:  AGE: 84 Y            GENDER: F PATIENT NAME/: KAMILA STEPHENS A    : 1940 UNIT NUMBER: [id number] ACCESSION NUMBER: [id number] ACCOUNT: PROCEDURE PERFORMED: DUPLEX US VENOUS EXT LOW BILATERAL Conclusions: Bilateral lower extremities without evidence of deep or superficial vein thrombus. Compared to previous examination 2024, there has been no significant change. THIS DOCUMENT HAS BEEN ELECTRONICALLY SIGNED BY: Darnell Pool MD    XR Chest 1 View    Result Date: 8/3/2024  Narrative: RE[facility]ING [facility]ULTS [facility] IS NOT A SUBSTITUTE FOR DISCUSSING THOSE RESULTS WITH YOUR HEALTH CARE PROVIDER. PLEASE CONTACT YOUR PROVIDER [facility]IA [facility] TO DISCUSS ANY QUESTIONS OR CONCERNS YOU MAY HAVE REGARDING THESE TEST RESULTS.  RADIOLOGY REPORT FACILITY:  [facility] UNIT/AGE/GENDER: J.ED  ER      AGE:84 Y          SEX:F PATIENT NAME/:  KAMILA STEPHENS A    1940 UNIT NUMBER:  [id number] ACCOUNT NUMBER:  [id number] ACCESSION NUMBER:  [id number] Chest Single View Examination dated 2024 CLINICAL HISTORY: SOB. COMPARISON: 2024 AP view of the chest is provided.   The patient is slightly rotated to the left. There is trace blunting of the left costophrenic angle. No right pleural effusion. Mild diffuse interstitial prominence, similar to prior, likely chronic change. No focal airspace consolidation. No pneumothorax. Stable cardiomediastinal silhouette. No acute osseous findings. IMPRESSION: There is slight blunting of the left costophrenic angle which could represent a trace effusion, decreased in size from comparison. No acute chest findings otherwise. Dictated by: Mary Lou Brown M.D. Images and Report reviewed and interpreted by: Mary Lou Brown M.D. <PS><Electronically signed by: Mary Lou Brown M.D.> 2024 D: 2024 T: 2024    XR Chest 1 View    Result Date: 2024  Narrative: REVIEWING  YOUR TEST RESULTS IN [facility] IS NOT A SUBSTITUTE FOR DISCUSSING THOSE RESULTS WITH YOUR HEALTH CARE PROVIDER. PLEASE CONTACT YOUR PROVIDER VIA [facility] TO DISCUSS ANY QUESTIONS OR CONCERNS YOU MAY HAVE REGARDING THESE TEST RESULTS.  RADIOLOGY REPORT FACILITY:  [facility] UNIT/AGE/GENDER: JOHANN.ED  ER      AGE:84 Y          SEX:F PATIENT NAME/:  KAMILA STEPHENS A    1940 UNIT NUMBER:  [id number] ACCOUNT NUMBER:  [id number] ACCESSION NUMBER:  [id number] DATE:  2024 EXAMINATION:  PORTABLE CHEST ONE VIEW HISTORY:  cough. COMPARISON:  2024 CONCLUSION:   1.  Examination limited by suboptimal positioning. Patient is rotated to left. Cardiomegaly is stable.. 2.  Small left pleural effusion demonstrated. No pneumothorax detected.. 3.  No pneumonia or pulmonary edema detected.. Dictated by: Jamaal Rodriguez M.D. Images and Report reviewed and interpreted by: Jamaal Rodriguez M.D. <PS><Electronically signed by: Jamaal Rodriguez M.D.> 2024 D: 2024 T: 2024    CT Head Without Contrast    Result Date: 2024  Narrative: RE[facility]ING YOUR TEST RESULTS IN [facility] IS NOT A SUBSTITUTE FOR DISCUSSING THOSE RESULTS WITH YOUR HEALTH CARE PROVIDER. PLEASE CONTACT YOUR PROVIDER VIA [facility] TO DISCUSS ANY QUESTIONS OR CONCERNS YOU MAY HAVE REGARDING THESE TEST RESULTS.  RADIOLOGY REPORT FACILITY:  [facility] UNIT/AGE/GENDER: JOHANN.ED  ER      AGE:84 Y          SEX:F PATIENT NAME/:  KAMILA STEPHENS A    1940 UNIT NUMBER:  [id number] ACCOUNT NUMBER:  [id number] ACCESSION NUMBER:  [id number] DATE:  2024 HISTORY: ams altered mental status EXAMINATION: CT SCAN OF BRAIN COMPARISON: July 15, 2024 FINDINGS:  The radiation dose reduction system was utilized for each scan per the ALARA (as low as reasonably achievable) protocol. The ventricular system is anatomic without hydrocephalus.  There is moderate cortical atrophy within the range of  normal for the patient's age. There is nonspecific decreased attenuation of periventricular white matter likely related to chronic small vessel disease. No intracranial mass or mass effect is demonstrated.  There is no evidence of acute hemorrhage or extra-axial fluid collection.   CONCLUSION: 1. Emergency CT brain unchanged from the examination performed 4 days earlier. No acute intracranial abnormality is demonstrated. 2. Cortical atrophy and white matter changes of chronic small vessel disease. Dictated by: Jamaal Rodriguez M.D. Images and Report reviewed and interpreted by: Jamaal Rodriguez M.D. <PS><Electronically signed by: Jamaal Rodriguez M.D.> 07/19/2024 2011 D: 07/19/2024 2009 T: 07/19/2024 2009     Final diagnoses:   None         ED Medications:  Medications - No data to display    New Medications:     Medication List        ASK your doctor about these medications      benzonatate 200 MG capsule  Commonly known as: TESSALON  Take 1 capsule by mouth 3 (Three) Times a Day As Needed for cough.     budesonide-formoterol 160-4.5 MCG/ACT inhaler  Commonly known as: SYMBICORT     esomeprazole 20 MG capsule  Commonly known as: nexIUM     furosemide 10 MG/ML ORAL solution  Commonly known as: LASIX     * ipratropium-albuterol 0.5-2.5 mg/3 ml nebulizer  Commonly known as: DUO-NEB     * ipratropium-albuterol  MCG/ACT inhaler  Commonly known as: COMBIVENT RESPIMAT     lisinopril 10 MG tablet  Commonly known as: PRINIVIL,ZESTRIL     NORVASC PO     warfarin 7.5 MG tablet  Commonly known as: COUMADIN           * This list has 2 medication(s) that are the same as other medications prescribed for you. Read the directions carefully, and ask your doctor or other care provider to review them with you.                  Stopped Medications:     Medication List        ASK your doctor about these medications      benzonatate 200 MG capsule  Commonly known as: TESSALON  Take 1 capsule by mouth 3 (Three) Times a Day As Needed for  cough.     budesonide-formoterol 160-4.5 MCG/ACT inhaler  Commonly known as: SYMBICORT     esomeprazole 20 MG capsule  Commonly known as: nexIUM     furosemide 10 MG/ML ORAL solution  Commonly known as: LASIX     * ipratropium-albuterol 0.5-2.5 mg/3 ml nebulizer  Commonly known as: DUO-NEB     * ipratropium-albuterol  MCG/ACT inhaler  Commonly known as: COMBIVENT RESPIMAT     lisinopril 10 MG tablet  Commonly known as: PRINIVIL,ZESTRIL     NORVASC PO     warfarin 7.5 MG tablet  Commonly known as: COUMADIN           * This list has 2 medication(s) that are the same as other medications prescribed for you. Read the directions carefully, and ask your doctor or other care provider to review them with you.                       Allen Russ MD  08/16/24 6494

## 2024-08-16 NOTE — PLAN OF CARE
Goal Outcome Evaluation:              Outcome Evaluation: New admit overnight for respiratory failure. Currently AAOx4, resting comfortably on 4L NC. F/C in place from ER. VSS.

## 2024-08-16 NOTE — PROGRESS NOTES
Seen and examined this a.m.  Off BiPAP more alert reports breathing was stable  Labs reviewed: ABG showed an improvement in hypercapnia, show possible over oxygenation will wean O2  A/P: Continue BiPAP at bedtime and as needed for now and continue other treatment as noted  Electronically signed by Sen Andres DO, 08/16/24, 11:46 AM EDT.

## 2024-08-17 LAB
ANION GAP SERPL CALCULATED.3IONS-SCNC: 10.2 MMOL/L (ref 5–15)
BASOPHILS # BLD AUTO: 0.01 10*3/MM3 (ref 0–0.2)
BASOPHILS NFR BLD AUTO: 0.2 % (ref 0–1.5)
BUN SERPL-MCNC: 38 MG/DL (ref 8–23)
BUN/CREAT SERPL: 26.4 (ref 7–25)
CALCIUM SPEC-SCNC: 8.7 MG/DL (ref 8.6–10.5)
CHLORIDE SERPL-SCNC: 109 MMOL/L (ref 98–107)
CO2 SERPL-SCNC: 22.8 MMOL/L (ref 22–29)
CREAT SERPL-MCNC: 1.44 MG/DL (ref 0.57–1)
DEPRECATED RDW RBC AUTO: 61.8 FL (ref 37–54)
EGFRCR SERPLBLD CKD-EPI 2021: 35.7 ML/MIN/1.73
EOSINOPHIL # BLD AUTO: 0.01 10*3/MM3 (ref 0–0.4)
EOSINOPHIL NFR BLD AUTO: 0.2 % (ref 0.3–6.2)
ERYTHROCYTE [DISTWIDTH] IN BLOOD BY AUTOMATED COUNT: 17.5 % (ref 12.3–15.4)
GLUCOSE SERPL-MCNC: 123 MG/DL (ref 65–99)
HCT VFR BLD AUTO: 25.2 % (ref 34–46.6)
HGB BLD-MCNC: 8 G/DL (ref 12–15.9)
IMM GRANULOCYTES # BLD AUTO: 0.03 10*3/MM3 (ref 0–0.05)
IMM GRANULOCYTES NFR BLD AUTO: 0.6 % (ref 0–0.5)
LYMPHOCYTES # BLD AUTO: 0.89 10*3/MM3 (ref 0.7–3.1)
LYMPHOCYTES NFR BLD AUTO: 16.7 % (ref 19.6–45.3)
MCH RBC QN AUTO: 30.7 PG (ref 26.6–33)
MCHC RBC AUTO-ENTMCNC: 31.7 G/DL (ref 31.5–35.7)
MCV RBC AUTO: 96.6 FL (ref 79–97)
MONOCYTES # BLD AUTO: 0.5 10*3/MM3 (ref 0.1–0.9)
MONOCYTES NFR BLD AUTO: 9.4 % (ref 5–12)
NEUTROPHILS NFR BLD AUTO: 3.88 10*3/MM3 (ref 1.7–7)
NEUTROPHILS NFR BLD AUTO: 72.9 % (ref 42.7–76)
NRBC BLD AUTO-RTO: 0 /100 WBC (ref 0–0.2)
PLATELET # BLD AUTO: 167 10*3/MM3 (ref 140–450)
PMV BLD AUTO: 8.9 FL (ref 6–12)
POTASSIUM SERPL-SCNC: 3.4 MMOL/L (ref 3.5–5.2)
PROCALCITONIN SERPL-MCNC: 16.9 NG/ML (ref 0–0.25)
RBC # BLD AUTO: 2.61 10*6/MM3 (ref 3.77–5.28)
SODIUM SERPL-SCNC: 142 MMOL/L (ref 136–145)
WBC NRBC COR # BLD AUTO: 5.32 10*3/MM3 (ref 3.4–10.8)

## 2024-08-17 PROCEDURE — 94799 UNLISTED PULMONARY SVC/PX: CPT

## 2024-08-17 PROCEDURE — 84145 PROCALCITONIN (PCT): CPT | Performed by: FAMILY MEDICINE

## 2024-08-17 PROCEDURE — 25010000002 VANCOMYCIN PER 500 MG: Performed by: FAMILY MEDICINE

## 2024-08-17 PROCEDURE — 85025 COMPLETE CBC W/AUTO DIFF WBC: CPT | Performed by: INTERNAL MEDICINE

## 2024-08-17 PROCEDURE — 94664 DEMO&/EVAL PT USE INHALER: CPT

## 2024-08-17 PROCEDURE — 94761 N-INVAS EAR/PLS OXIMETRY MLT: CPT

## 2024-08-17 PROCEDURE — 99232 SBSQ HOSP IP/OBS MODERATE 35: CPT | Performed by: INTERNAL MEDICINE

## 2024-08-17 PROCEDURE — 25010000002 PIPERACILLIN SOD-TAZOBACTAM PER 1 G: Performed by: INTERNAL MEDICINE

## 2024-08-17 PROCEDURE — 80048 BASIC METABOLIC PNL TOTAL CA: CPT | Performed by: INTERNAL MEDICINE

## 2024-08-17 RX ADMIN — CARVEDILOL 6.25 MG: 3.12 TABLET, FILM COATED ORAL at 21:00

## 2024-08-17 RX ADMIN — CARVEDILOL 6.25 MG: 3.12 TABLET, FILM COATED ORAL at 08:36

## 2024-08-17 RX ADMIN — FOLIC ACID 1 MG: 1 TABLET ORAL at 08:34

## 2024-08-17 RX ADMIN — VITAM B12 50 MCG: 100 TAB at 08:37

## 2024-08-17 RX ADMIN — SENNOSIDES AND DOCUSATE SODIUM 2 TABLET: 50; 8.6 TABLET ORAL at 20:59

## 2024-08-17 RX ADMIN — PIPERACILLIN SODIUM AND TAZOBACTAM SODIUM 3.38 G: 3; .375 INJECTION, POWDER, LYOPHILIZED, FOR SOLUTION INTRAVENOUS at 01:35

## 2024-08-17 RX ADMIN — PANTOPRAZOLE SODIUM 40 MG: 40 TABLET, DELAYED RELEASE ORAL at 08:36

## 2024-08-17 RX ADMIN — BUDESONIDE 0.5 MG: 0.5 INHALANT RESPIRATORY (INHALATION) at 21:08

## 2024-08-17 RX ADMIN — APIXABAN 2.5 MG: 2.5 TABLET, FILM COATED ORAL at 20:59

## 2024-08-17 RX ADMIN — VANCOMYCIN HYDROCHLORIDE 500 MG: 500 INJECTION, POWDER, LYOPHILIZED, FOR SOLUTION INTRAVENOUS at 01:40

## 2024-08-17 RX ADMIN — FERROUS SULFATE TAB 325 MG (65 MG ELEMENTAL FE) 325 MG: 325 (65 FE) TAB at 08:36

## 2024-08-17 RX ADMIN — FUROSEMIDE 20 MG: 20 TABLET ORAL at 08:36

## 2024-08-17 RX ADMIN — GUAIFENESIN 1200 MG: 600 TABLET, EXTENDED RELEASE ORAL at 08:36

## 2024-08-17 RX ADMIN — Medication 10 ML: at 21:00

## 2024-08-17 RX ADMIN — IPRATROPIUM BROMIDE AND ALBUTEROL SULFATE 3 ML: .5; 3 SOLUTION RESPIRATORY (INHALATION) at 14:21

## 2024-08-17 RX ADMIN — ALPRAZOLAM 0.25 MG: 0.25 TABLET ORAL at 20:59

## 2024-08-17 RX ADMIN — SENNOSIDES AND DOCUSATE SODIUM 2 TABLET: 50; 8.6 TABLET ORAL at 08:35

## 2024-08-17 RX ADMIN — ARFORMOTEROL TARTRATE 15 MCG: 15 SOLUTION RESPIRATORY (INHALATION) at 21:08

## 2024-08-17 RX ADMIN — Medication 10 ML: at 08:37

## 2024-08-17 RX ADMIN — AMLODIPINE BESYLATE 5 MG: 5 TABLET ORAL at 08:34

## 2024-08-17 RX ADMIN — PIPERACILLIN SODIUM AND TAZOBACTAM SODIUM 3.38 G: 3; .375 INJECTION, POWDER, LYOPHILIZED, FOR SOLUTION INTRAVENOUS at 09:44

## 2024-08-17 RX ADMIN — ALLOPURINOL 200 MG: 100 TABLET ORAL at 08:35

## 2024-08-17 RX ADMIN — GUAIFENESIN 1200 MG: 600 TABLET, EXTENDED RELEASE ORAL at 20:59

## 2024-08-17 RX ADMIN — ARFORMOTEROL TARTRATE 15 MCG: 15 SOLUTION RESPIRATORY (INHALATION) at 09:48

## 2024-08-17 RX ADMIN — BUDESONIDE 0.5 MG: 0.5 INHALANT RESPIRATORY (INHALATION) at 09:48

## 2024-08-17 RX ADMIN — APIXABAN 2.5 MG: 2.5 TABLET, FILM COATED ORAL at 08:36

## 2024-08-17 NOTE — CASE MANAGEMENT/SOCIAL WORK
Continued Stay Note  KOJO Martin     Patient Name: Alberta Flores  MRN: [id number]  Today's Date: 8/17/2024    Admit Date: 8/16/2024    Plan: Return to [facility] bed pending pre cert once PT evals   Discharge Plan       Row Name 08/17/24 1258       Plan    Plan Return to [facility] skilled bed pending pre cert once PT evals    Patient/Family in Agreement with Plan yes    Plan Comments CCP called and spoke with Neelima Pavon/Lamar, who confirms patient is resident at [facility] and can return pending pre cert. Patient with eventual plans for long term at facility. Patient needs PT eval in order to initiate pre cert. CCP updated charge KEZIA Nunez. Carlos MAST CCP                   Discharge Codes    No documentation.                 Expected Discharge Date and Time       Expected Discharge Date Expected Discharge Time    Aug 17, 2024               Carlos Wolf RN

## 2024-08-17 NOTE — DISCHARGE PLACEMENT REQUEST
"Kamila Stephens (84 y.o. Female)       Date of Birth   1940    Social Security Number       Address   [address] [id number]    Home Phone   [phone]    MRN   [id number]       Oriental orthodox   Non-Yazidism    Marital Status   Single                            Admission Date   8/16/24    Admission Type   Emergency    Admitting Provider   Jalil Tee DO    Attending Provider   Jalil Tee DO    Department, Room/Bed   [facility] MED SURG, 1409/1       Discharge Date       Discharge Disposition       Discharge Destination                                 Attending Provider: Jalil Tee DO    Allergies: Iodine, Fluticasone, Sulfa Antibiotics, Acetaminophen-codeine, Ceftin [Cefuroxime Axetil], Codeine, Fluconazole, Fluocinolone, Fluocinolone Acetonide, Nuts, Tylenol [Acetaminophen], Vioxx [Rofecoxib], Atorvastatin, Enoxaparin, Shellfish Allergy    Isolation: None   Infection: None   Code Status: No CPR    Ht: 152.4 cm (60\")   Wt: 50.6 kg (111 lb 8.8 oz)    Admission Cmt: None   Principal Problem: Respiratory failure [J96.90]                   Active Insurance as of 8/16/2024       Primary Coverage       Payor Plan Insurance Group Employer/Plan Group    HUMANA MEDICARE REPLACEMENT HUMANA MEDICARE REPLACEMENT [id number]       Payor Plan Address Payor Plan Phone Number Payor Plan Fax Number Effective Dates    [address] [address] [phone]  1/1/2022 - None Entered    [facility] [id number]         Subscriber Name Subscriber Birth Date Member ID       KAMILA STEPHENS 1940 [id number]                     Emergency Contacts        (Rel.) Home Phone Work Phone Mobile Phone    Debby Dominguez (Daughter) -- -- [phone]    Cata Griggs (Daughter) -- -- [phone]              {Outbreak/Travel/Exposure Documentation......;  Question Available Choices Patient Response   COVID-19 Outbreak Screen:  Do you currently have a new onset of the following symptoms?        " Fever/Chills, Cough, Shortness of air, Loss of taste or smell, No, Unknown  (!) Shortness of Air (08/16/24 0208)   COVID-19 Outbreak Screen: In the last 14 days, have you had contact with anyone who is ill, has show any of the symptoms listed above and/or has been diagnosis with the 2019 Novel Coronavirus? This includes any immediate household members but excludes any patients with whom you have been in contact within your normal work duties wearing proper PPE, if you are a healthcare worker.  Yes, No, Unknown              (not recorded)   COVID-19 Outbreak Screen: Who was notified? Free text (not recorded)   Ebola Screening Outbreak Screen: Have you traveled to the [place] the [place] or [place] within the past 21 days?  Yes, No, Unknown (not recorded)   Ebola Screening Outbreak Screen: Do you have ANY of the following symptoms: Fever/Chills, Vomiting, Diarrhea, Fatigue, Headache, Muscle pain, Unexplained bleeding, Abdominal (stomach) pain, No, Unknown (not recorded)   Ebola Screening Outbreak Screen: Name of Person notified Free text (not recorded)   Travel Screen: Have you traveled in the last month? If so, to what country have you traveled? If [place] what state? Yes, No, Unknown  List of all countries  List of all States No (08/16/24 0208)  (not recorded)  (not recorded)   Infection Risk: Do you currently have the following symptoms?  (If cough is selected, the Tuberculosis Screen is performed.) Cough, Fever, Rash, No No (08/16/24 0208)   Tuberculosis Screen: Do you have any of the following Tuberculosis Risks?  Have you lived or spent time with anyone who had or may have TB?  Have you lived in or visited any of the following areas for more than one month: [place], [place], [place], [place] or [place], the [place] or [place]?  Do you have HIV/AIDS?  Have you lived in or worked in a nursing home, homeless shelter, correctional facility, or substance abuse treatment facility?   No    If Yes do  you have any of the following symptoms? Yes responses display to the right    If Yes, symptoms listed are:  Cough greater than or equal to 3 weeks, Loss of appetite, Unexplained weight loss, Night sweats, Bloody sputum or hemoptysis, Hoarseness, Fever, Fatigue, Chest pain, No (not recorded)  (not recorded)   Exposure Screen: Have you been exposed to any of these contagious diseases in the last month? Measles, Chickenpox, Meningitis, Pertussis, Whooping Cough, No No (08/16/24 5237)

## 2024-08-17 NOTE — PLAN OF CARE
Goal Outcome Evaluation:  Plan of Care Reviewed With: patient        Progress: no change  Outcome Evaluation: Ax4. On 3 LPM via NC. Patient is anxious and verbalized being short of breath despite breathing treatment, RR in normal range, hospitalist informed. Responded to additional breathing tx and antianxiety medication given, slept well thereafter. FC in place.

## 2024-08-17 NOTE — PROGRESS NOTES
"Hospital Medicine Team    LOS 1 days      Patient Care Team:  Karsten Caldera MD as PCP - General (Family Medicine)      Subjective       Chief Complaint:  f/u shortness of breath    Subjective    Reports breathing feels better today no other new issues per nurse    Objective       Vital Signs  Temp:  [97.1 °F (36.2 °C)-97.8 °F (36.6 °C)] 97.8 °F (36.6 °C)  Heart Rate:  [60-71] 64  Resp:  [18-22] 20  BP: (127-158)/(62-81) 131/67  Oxygen Therapy  SpO2: 97 %  Pulse Oximetry Type: Continuous  Device (Oxygen Therapy): nasal cannula  Flow (L/min): 2.5  Oxygen Concentration (%): 40  Flowsheet Rows      Flowsheet Row First Filed Value   Admission Height 152.4 cm (60\") Documented at 08/16/2024 0345   Admission Weight 43.1 kg (95 lb) Documented at 08/16/2024 0108                Physical Exam:  Physical Exam  Vitals reviewed.   Constitutional:       Appearance: She is ill-appearing.   Cardiovascular:      Rate and Rhythm: Normal rate.   Pulmonary:      Effort: No respiratory distress.   Abdominal:      General: There is no distension.   Neurological:      Mental Status: She is alert. Mental status is at baseline.           Results Review:    I reviewed the patient's new clinical results.    [x]  Laboratory  [x]  Microbiology  []  Radiology  []  EKG/Telemetry   []  Cardiology/Vascular   []  Pathology  []  Old records  []  Other:      X-rays, labs reviewed personally by physician.    Medication Review:   I have reviewed the patient's current medication list    Scheduled Meds  allopurinol, 200 mg, Oral, Daily  amLODIPine, 5 mg, Oral, Daily  apixaban, 2.5 mg, Oral, BID  arformoterol, 15 mcg, Nebulization, BID - RT  budesonide, 0.5 mg, Nebulization, BID  carvedilol, 6.25 mg, Oral, BID With Meals  docusate sodium, 100 mg, Oral, Nightly  ferrous sulfate, 325 mg, Oral, Daily With Breakfast  folic acid, 1 mg, Oral, Daily  furosemide, 20 mg, Oral, Daily  guaiFENesin, 1,200 mg, Oral, BID  pantoprazole, 40 mg, Oral, " Daily  piperacillin-tazobactam, 3.375 g, Intravenous, Q8H  senna-docusate sodium, 2 tablet, Oral, BID  sodium chloride, 10 mL, Intravenous, Q12H  [START ON 8/18/2024] vancomycin, 500 mg, Intravenous, Q24H  vitamin B-12, 50 mcg, Oral, Daily        Meds Infusions  Pharmacy to dose vancomycin,         Meds PRN  •  acetaminophen  •  ALPRAZolam  •  senna-docusate sodium **AND** polyethylene glycol **AND** bisacodyl **AND** bisacodyl  •  ipratropium-albuterol  •  labetalol  •  nitroglycerin  •  ondansetron ODT **OR** ondansetron  •  Pharmacy to dose vancomycin  •  sodium chloride  •  sodium chloride        Assessment / Plan       Active Hospital Problems:  Active Hospital Problems    Diagnosis  POA   • **Respiratory failure [J96.90]  Yes      Resolved Hospital Problems   No resolved problems to display.       Acute on chronic hypoxic and hypercarbic respiratory failure with sepsis, POA  Likely secondary to aspiration PNA/COPD exacerbation   -Now refusing BiPAP.  ABG showed resolution hypercapnia  -Cx with no growth thus far  -Continue Zosyn  -Procalcitonin increased however WBC normal, follow-up Pro-Richard in a.m.        Atrial fibrillation with RVR  Rate stable  Continue Coreg and Eliquis   Monitor      HTN  Stable  CTN home med  Prn IV labetalol for systolic >170       Chronic HFpEF  BNP elevated, but no signs of significant overload  CTN p.o. Lasix     CKD III  Creatinine near baseline at 1.24 per chart review  Monitor closely      DVT ppx:  Eliquis      Code status:  DNR/DNI    DVT ppx-Eliquis      Plan for disposition: If improved and cultures remain negative possible discharge back to LTC tomorrow    Electronically signed by Sen Anders DO, 08/17/24, 12:46 EDT.          Note disclaimer: At [facility], we believe that sharing information builds trust and better relationships. You are receiving this note because you recently visited [facility]. It is possible you will see health information before a provider  has talked with you about it. This kind of information can be easy to misunderstand. To help you fully understand what it means for your health, we urge you to discuss this note with your provider.

## 2024-08-17 NOTE — SIGNIFICANT NOTE
08/16/24 2200   Clinician Notification   Reason for Communication Evaluate   Clinician Name Dr. Savage   Clinician Role Hospitalist   Method of Communication Call   Response See orders  (Given another dose of Xanax  0.25 mg STAT; Albuterol nebulization STAT; increase current O2 to 4 LPM.)   Notification Time 2200

## 2024-08-17 NOTE — NURSING NOTE
This writer spoke to staff at [place] at [facility] as pt stated facility wasn't going to hold her bed. Staff reported pt does indeed have a bed and pt can return anytime when able.

## 2024-08-17 NOTE — PLAN OF CARE
Goal Outcome Evaluation:   Pt not requiring NIV. She has improvement in SOA & level of consciousness.

## 2024-08-17 NOTE — PLAN OF CARE
Goal Outcome Evaluation:  Plan of Care Reviewed With: patient        Progress: improving  Outcome Evaluation: Kaye discontinued - pt is voiding on her own. Repiratory therapy working with pt - brought pt down to 0.5 L - saturating at 93-95%. Pt has no complaints of SOA to this writer during shift. Pt stated she's hungry - tolerating PO intake. No complaints of pain, nausea/vomiting.

## 2024-08-18 ENCOUNTER — APPOINTMENT (OUTPATIENT)
Dept: GENERAL RADIOLOGY | Facility: HOSPITAL | Age: 84
DRG: 871 | End: 2024-08-18
Payer: MEDICARE

## 2024-08-18 LAB
ANION GAP SERPL CALCULATED.3IONS-SCNC: 10 MMOL/L (ref 5–15)
ARTERIAL PATENCY WRIST A: ABNORMAL
ATMOSPHERIC PRESS: 739 MMHG
BASE EXCESS BLDA CALC-SCNC: -2.8 MMOL/L (ref 0–2)
BASOPHILS # BLD AUTO: 0.04 10*3/MM3 (ref 0–0.2)
BASOPHILS NFR BLD AUTO: 0.5 % (ref 0–1.5)
BDY SITE: ABNORMAL
BODY TEMPERATURE: 37
BUN SERPL-MCNC: 41 MG/DL (ref 8–23)
BUN/CREAT SERPL: 23.8 (ref 7–25)
CALCIUM SPEC-SCNC: 8.7 MG/DL (ref 8.6–10.5)
CHLORIDE SERPL-SCNC: 108 MMOL/L (ref 98–107)
CO2 SERPL-SCNC: 22 MMOL/L (ref 22–29)
CREAT SERPL-MCNC: 1.72 MG/DL (ref 0.57–1)
DEPRECATED RDW RBC AUTO: 63.8 FL (ref 37–54)
EGFRCR SERPLBLD CKD-EPI 2021: 28.9 ML/MIN/1.73
EOSINOPHIL # BLD AUTO: 0.35 10*3/MM3 (ref 0–0.4)
EOSINOPHIL NFR BLD AUTO: 4.7 % (ref 0.3–6.2)
ERYTHROCYTE [DISTWIDTH] IN BLOOD BY AUTOMATED COUNT: 17.8 % (ref 12.3–15.4)
GAS FLOW AIRWAY: 3 LPM
GLUCOSE SERPL-MCNC: 102 MG/DL (ref 65–99)
HCO3 BLDA-SCNC: 24.3 MMOL/L (ref 20–26)
HCT VFR BLD AUTO: 26.5 % (ref 34–46.6)
HGB BLD-MCNC: 8.3 G/DL (ref 12–15.9)
HGB BLDA-MCNC: 10.3 G/DL (ref 13.5–17.5)
IMM GRANULOCYTES # BLD AUTO: 0.03 10*3/MM3 (ref 0–0.05)
IMM GRANULOCYTES NFR BLD AUTO: 0.4 % (ref 0–0.5)
LYMPHOCYTES # BLD AUTO: 1.08 10*3/MM3 (ref 0.7–3.1)
LYMPHOCYTES NFR BLD AUTO: 14.4 % (ref 19.6–45.3)
Lab: ABNORMAL
MCH RBC QN AUTO: 30.5 PG (ref 26.6–33)
MCHC RBC AUTO-ENTMCNC: 31.3 G/DL (ref 31.5–35.7)
MCV RBC AUTO: 97.4 FL (ref 79–97)
MODALITY: ABNORMAL
MONOCYTES # BLD AUTO: 0.75 10*3/MM3 (ref 0.1–0.9)
MONOCYTES NFR BLD AUTO: 10 % (ref 5–12)
NEUTROPHILS NFR BLD AUTO: 5.23 10*3/MM3 (ref 1.7–7)
NEUTROPHILS NFR BLD AUTO: 70 % (ref 42.7–76)
NRBC BLD AUTO-RTO: 0 /100 WBC (ref 0–0.2)
PCO2 BLDA: 52.5 MM HG (ref 35–45)
PCO2 TEMP ADJ BLD: 52.5 MM HG (ref 35–45)
PH BLDA: 7.27 PH UNITS (ref 7.35–7.45)
PH, TEMP CORRECTED: 7.27 PH UNITS (ref 7.35–7.45)
PLATELET # BLD AUTO: 164 10*3/MM3 (ref 140–450)
PMV BLD AUTO: 9 FL (ref 6–12)
PO2 BLDA: 61.5 MM HG (ref 83–108)
PO2 TEMP ADJ BLD: 61.5 MM HG (ref 83–108)
POTASSIUM SERPL-SCNC: 3.7 MMOL/L (ref 3.5–5.2)
PROCALCITONIN SERPL-MCNC: 14.9 NG/ML (ref 0–0.25)
RBC # BLD AUTO: 2.72 10*6/MM3 (ref 3.77–5.28)
SAO2 % BLDCOA: 89.2 % (ref 94–99)
SODIUM SERPL-SCNC: 140 MMOL/L (ref 136–145)
VANCOMYCIN SERPL-MCNC: 7.08 MCG/ML (ref 5–40)
WBC NRBC COR # BLD AUTO: 7.48 10*3/MM3 (ref 3.4–10.8)

## 2024-08-18 PROCEDURE — 80048 BASIC METABOLIC PNL TOTAL CA: CPT | Performed by: INTERNAL MEDICINE

## 2024-08-18 PROCEDURE — 71045 X-RAY EXAM CHEST 1 VIEW: CPT

## 2024-08-18 PROCEDURE — 85025 COMPLETE CBC W/AUTO DIFF WBC: CPT | Performed by: INTERNAL MEDICINE

## 2024-08-18 PROCEDURE — 82803 BLOOD GASES ANY COMBINATION: CPT

## 2024-08-18 PROCEDURE — 94799 UNLISTED PULMONARY SVC/PX: CPT

## 2024-08-18 PROCEDURE — 94761 N-INVAS EAR/PLS OXIMETRY MLT: CPT

## 2024-08-18 PROCEDURE — 84145 PROCALCITONIN (PCT): CPT | Performed by: INTERNAL MEDICINE

## 2024-08-18 PROCEDURE — 99232 SBSQ HOSP IP/OBS MODERATE 35: CPT | Performed by: INTERNAL MEDICINE

## 2024-08-18 PROCEDURE — 94664 DEMO&/EVAL PT USE INHALER: CPT

## 2024-08-18 PROCEDURE — 80202 ASSAY OF VANCOMYCIN: CPT | Performed by: FAMILY MEDICINE

## 2024-08-18 PROCEDURE — 25010000002 VANCOMYCIN PER 500 MG: Performed by: FAMILY MEDICINE

## 2024-08-18 PROCEDURE — 25010000002 PIPERACILLIN SOD-TAZOBACTAM PER 1 G: Performed by: INTERNAL MEDICINE

## 2024-08-18 PROCEDURE — 25010000002 FUROSEMIDE PER 20 MG: Performed by: HOSPITALIST

## 2024-08-18 PROCEDURE — 97161 PT EVAL LOW COMPLEX 20 MIN: CPT

## 2024-08-18 PROCEDURE — 36600 WITHDRAWAL OF ARTERIAL BLOOD: CPT

## 2024-08-18 RX ORDER — FUROSEMIDE 10 MG/ML
40 INJECTION INTRAMUSCULAR; INTRAVENOUS
Status: DISCONTINUED | OUTPATIENT
Start: 2024-08-19 | End: 2024-08-18

## 2024-08-18 RX ORDER — FUROSEMIDE 20 MG
20 TABLET ORAL DAILY PRN
Status: DISCONTINUED | OUTPATIENT
Start: 2024-08-18 | End: 2024-08-20 | Stop reason: HOSPADM

## 2024-08-18 RX ORDER — FUROSEMIDE 10 MG/ML
40 INJECTION INTRAMUSCULAR; INTRAVENOUS
Status: DISCONTINUED | OUTPATIENT
Start: 2024-08-18 | End: 2024-08-19

## 2024-08-18 RX ADMIN — BUDESONIDE 0.5 MG: 0.5 INHALANT RESPIRATORY (INHALATION) at 10:23

## 2024-08-18 RX ADMIN — IPRATROPIUM BROMIDE AND ALBUTEROL SULFATE 3 ML: .5; 3 SOLUTION RESPIRATORY (INHALATION) at 08:09

## 2024-08-18 RX ADMIN — ALLOPURINOL 200 MG: 100 TABLET ORAL at 10:20

## 2024-08-18 RX ADMIN — ARFORMOTEROL TARTRATE 15 MCG: 15 SOLUTION RESPIRATORY (INHALATION) at 10:30

## 2024-08-18 RX ADMIN — APIXABAN 2.5 MG: 2.5 TABLET, FILM COATED ORAL at 10:20

## 2024-08-18 RX ADMIN — FUROSEMIDE 40 MG: 10 INJECTION, SOLUTION INTRAVENOUS at 22:25

## 2024-08-18 RX ADMIN — APIXABAN 2.5 MG: 2.5 TABLET, FILM COATED ORAL at 20:43

## 2024-08-18 RX ADMIN — VANCOMYCIN HYDROCHLORIDE 500 MG: 500 INJECTION, POWDER, LYOPHILIZED, FOR SOLUTION INTRAVENOUS at 01:49

## 2024-08-18 RX ADMIN — IPRATROPIUM BROMIDE AND ALBUTEROL SULFATE 3 ML: .5; 3 SOLUTION RESPIRATORY (INHALATION) at 17:55

## 2024-08-18 RX ADMIN — ACETAMINOPHEN 650 MG: 325 TABLET ORAL at 10:20

## 2024-08-18 RX ADMIN — ARFORMOTEROL TARTRATE 15 MCG: 15 SOLUTION RESPIRATORY (INHALATION) at 21:34

## 2024-08-18 RX ADMIN — PANTOPRAZOLE SODIUM 40 MG: 40 TABLET, DELAYED RELEASE ORAL at 10:20

## 2024-08-18 RX ADMIN — GUAIFENESIN 1200 MG: 600 TABLET, EXTENDED RELEASE ORAL at 20:43

## 2024-08-18 RX ADMIN — PIPERACILLIN SODIUM AND TAZOBACTAM SODIUM 3.38 G: 3; .375 INJECTION, POWDER, LYOPHILIZED, FOR SOLUTION INTRAVENOUS at 01:50

## 2024-08-18 RX ADMIN — CARVEDILOL 6.25 MG: 3.12 TABLET, FILM COATED ORAL at 17:46

## 2024-08-18 RX ADMIN — FERROUS SULFATE TAB 325 MG (65 MG ELEMENTAL FE) 325 MG: 325 (65 FE) TAB at 10:20

## 2024-08-18 RX ADMIN — Medication 10 ML: at 10:28

## 2024-08-18 RX ADMIN — FOLIC ACID 1 MG: 1 TABLET ORAL at 10:20

## 2024-08-18 RX ADMIN — Medication 10 ML: at 20:45

## 2024-08-18 RX ADMIN — ALPRAZOLAM 0.25 MG: 0.25 TABLET ORAL at 21:28

## 2024-08-18 RX ADMIN — BUDESONIDE 0.5 MG: 0.5 INHALANT RESPIRATORY (INHALATION) at 21:34

## 2024-08-18 RX ADMIN — ALPRAZOLAM 0.25 MG: 0.25 TABLET ORAL at 10:19

## 2024-08-18 RX ADMIN — PIPERACILLIN SODIUM AND TAZOBACTAM SODIUM 3.38 G: 3; .375 INJECTION, POWDER, LYOPHILIZED, FOR SOLUTION INTRAVENOUS at 17:46

## 2024-08-18 RX ADMIN — AMLODIPINE BESYLATE 5 MG: 5 TABLET ORAL at 10:20

## 2024-08-18 RX ADMIN — PIPERACILLIN SODIUM AND TAZOBACTAM SODIUM 3.38 G: 3; .375 INJECTION, POWDER, LYOPHILIZED, FOR SOLUTION INTRAVENOUS at 10:28

## 2024-08-18 RX ADMIN — CARVEDILOL 6.25 MG: 3.12 TABLET, FILM COATED ORAL at 10:19

## 2024-08-18 RX ADMIN — GUAIFENESIN 1200 MG: 600 TABLET, EXTENDED RELEASE ORAL at 10:20

## 2024-08-18 NOTE — PLAN OF CARE
Pt doing well today. Completed PT eval that will be needed in order to do precert back to [facility]. No CM on this weekend, possibly dc tomorrow as long as paperwork goes well. Pt ambulating with staff, tolerating po diuretics and abx. Pt daughter visited today and was updated on POC and agreeable to plan at this time. She requests we call her with any updates.          Problem: Fall Injury Risk  Goal: Absence of Fall and Fall-Related Injury  Outcome: Ongoing, Progressing  Intervention: Identify and Manage Contributors  Recent Flowsheet Documentation  Taken 8/18/2024 0801 by Malini Lezama RN  Medication Review/Management: medications reviewed  Intervention: Promote Injury-Free Environment  Recent Flowsheet Documentation  Taken 8/18/2024 0801 by Malini Lezama RN  Safety Promotion/Fall Prevention: safety round/check completed     Problem: Adult Inpatient Plan of Care  Goal: Plan of Care Review  Outcome: Ongoing, Progressing  Goal: Patient-Specific Goal (Individualized)  Outcome: Ongoing, Progressing  Goal: Absence of Hospital-Acquired Illness or Injury  Outcome: Ongoing, Progressing  Intervention: Identify and Manage Fall Risk  Recent Flowsheet Documentation  Taken 8/18/2024 0801 by Malini Lezama RN  Safety Promotion/Fall Prevention: safety round/check completed  Intervention: Prevent Skin Injury  Recent Flowsheet Documentation  Taken 8/18/2024 0801 by Malini Lezama RN  Body Position: position changed independently  Skin Protection: adhesive use limited  Intervention: Prevent and Manage VTE (Venous Thromboembolism) Risk  Recent Flowsheet Documentation  Taken 8/18/2024 0801 by Malini Lezama RN  Activity Management: up to bedside commode  VTE Prevention/Management: (eliquis)   bilateral   sequential compression devices off  Range of Motion: ROM (range of motion) performed  Intervention: Prevent Infection  Recent Flowsheet Documentation  Taken 8/18/2024 0801 by Teja  KEZIA Nayak  Infection Prevention:   rest/sleep promoted   hand hygiene promoted  Goal: Optimal Comfort and Wellbeing  Outcome: Ongoing, Progressing  Intervention: Provide Person-Centered Care  Recent Flowsheet Documentation  Taken 8/18/2024 0801 by Malini Lezama RN  Trust Relationship/Rapport:   care explained   choices provided  Goal: Readiness for Transition of Care  Outcome: Ongoing, Progressing     Problem: COPD (Chronic Obstructive Pulmonary Disease) Comorbidity  Goal: Maintenance of COPD Symptom Control  Outcome: Ongoing, Progressing  Intervention: Maintain COPD-Symptom Control  Recent Flowsheet Documentation  Taken 8/18/2024 0801 by Malini Lezama RN  Medication Review/Management: medications reviewed     Problem: Heart Failure Comorbidity  Goal: Maintenance of Heart Failure Symptom Control  Outcome: Ongoing, Progressing  Intervention: Maintain Heart Failure-Management  Recent Flowsheet Documentation  Taken 8/18/2024 0801 by Malini Lezama RN  Medication Review/Management: medications reviewed     Problem: Hypertension Comorbidity  Goal: Blood Pressure in Desired Range  Outcome: Ongoing, Progressing  Intervention: Maintain Blood Pressure Management  Recent Flowsheet Documentation  Taken 8/18/2024 0801 by Malini Lezama RN  Medication Review/Management: medications reviewed     Problem: Skin Injury Risk Increased  Goal: Skin Health and Integrity  Outcome: Ongoing, Progressing  Intervention: Optimize Skin Protection  Recent Flowsheet Documentation  Taken 8/18/2024 0801 by Malini Lezama RN  Pressure Reduction Techniques: frequent weight shift encouraged  Head of Bed (HOB) Positioning: HOB elevated  Pressure Reduction Devices: pressure-redistributing mattress utilized  Skin Protection: adhesive use limited   Goal Outcome Evaluation:

## 2024-08-18 NOTE — SIGNIFICANT NOTE
08/18/24 1532   Post Acute Pre-Cert Documentation   Request Submitted by Facility - Type: Hospital   Post-Acute Authorization Type Submitted: SNF   Date Post Acute Pre-Cert Inititated per Facility 08/18/24   Accepting Facility [facility] Discharge Date Requested 08/18/24   All Clinicals Submitted? Yes   Had Accepting Facility at Time of Submission Yes   Response Communicated to: Accepting Facility Liaison   Authorization Number: [id number]

## 2024-08-18 NOTE — PLAN OF CARE
"Goal Outcome Evaluation:  Plan of Care Reviewed With: patient        Progress: improving  Outcome Evaluation: PT: Initial evaluation complete, pt awake/alert upon arrival and states she needs to use the bathroom. Pt is oriented to person and place, but reports she lives with her dtr in [place]. Offered to assist pt with amb to bathroom and pt states \"I cannot walk\". BSC was positioned at foot of bed and pt was able to roll R and transfer sup-sit with mod independence. Pt with independent static sitting balance and performed SPS transfer from bed to BSC with CGA and no AD. Pt stood with UE support on bed to allow completion of hygiene and then pivoted over to sit on EOB. Pt transferred sit-sup with modified independence. Pt reports several recent falls and primarily using a w/c for mobility. Pt on 3L O2 per NC, states she was awake most of the night \"trying to breathe\".      Anticipated Discharge Disposition (PT): assisted living, skilled nursing facility                        "

## 2024-08-18 NOTE — PLAN OF CARE
Goal Outcome Evaluation:  Plan of Care Reviewed With: patient        Progress: improving  Outcome Evaluation: VS stable, Ax4. Patient tolerates getting up with assist x1 to BSC. Able to void freely with occasional incontinence noted. Patient reports intermittent shortness of breath with exertion, with sats maintained with in normal range with O2 at 0.5L.

## 2024-08-18 NOTE — CASE MANAGEMENT/SOCIAL WORK
Continued Stay Note  KOJO Martin     Patient Name: Alberta Flores  MRN: [id number]  Today's Date: 8/18/2024    Admit Date: 8/16/2024    Plan: Return to [facility] skilled bed pending pre-cert   Discharge Plan       Row Name 08/18/24 1549       Plan    Plan Return to [facility] pending pre-cert    Plan Comments Pre-cert started and pending. CCP to follow. [facility]                   Discharge Codes    No documentation.                 Expected Discharge Date and Time       Expected Discharge Date Expected Discharge Time    Aug 17, 2024               Grupo Walker RN

## 2024-08-18 NOTE — THERAPY EVALUATION
Patient Name: Alberta Flores  : 1940    MRN: [id number]                              Today's Date: 2024       Admit Date: 2024    Visit Dx:     ICD-10-CM ICD-9-CM   1. Acute respiratory failure with hypoxia and hypercapnia  J96.01 518.81    J96.02    2. HAP (hospital-acquired pneumonia)  J18.9 486    Y95    3. Acute on chronic systolic congestive heart failure  I50.23 428.23     428.0   4. COPD exacerbation  J44.1 491.21     Patient Active Problem List   Diagnosis    Respiratory failure     Past Medical History:   Diagnosis Date    Asthma     CHF (congestive heart failure)     COPD (chronic obstructive pulmonary disease)     GERD (gastroesophageal reflux disease)     Hypertension      History reviewed. No pertinent surgical history.   General Information       Row Name 24 1041          Physical Therapy Time and Intention    Document Type evaluation  -JV     Mode of Treatment individual therapy;physical therapy  -JV       Row Name 24 1041          General Information    Patient Profile Reviewed yes  -JV     Prior Level of Function min assist:;ADL's;w/c or scooter  -JV     Existing Precautions/Restrictions fall  -JV     Barriers to Rehab previous functional deficit;cognitive status  -JV       Row Name 24 1041          Living Environment    People in Home other (see comments);facility resident  Pt currently in Memory Care at Assisted Living facility  -JV       Row Name 24 1041          Home Main Entrance    Number of Stairs, Main Entrance none  -JV       Row Name 24 1041          Cognition    Orientation Status (Cognition) oriented to;person;place;other (see comments)  Pt reports she lives with her dtr in [place]  -JV       Row Name 24 1041          Safety Issues, Functional Mobility    Impairments Affecting Function (Mobility) balance;cognition;endurance/activity tolerance;shortness of breath;strength  -JV               User Key  (r) = Recorded By, (t) = Taken By, (c)  "= Cosigned By      Initials Name Provider Type    Heidi Carvalho, PT Physical Therapist                   Mobility       Row Name 08/18/24 1046          Bed Mobility    Bed Mobility rolling right;scooting/bridging;supine-sit;sit-supine  -JV     Rolling Right [place] (Bed Mobility) modified independence  -JV     Scooting/Bridging [place] (Bed Mobility) independent  -JV     Supine-Sit [place] (Bed Mobility) contact guard  -JV     Sit-Supine [place] (Bed Mobility) modified independence  -JV     Assistive Device (Bed Mobility) bed rails;head of bed elevated  -JV       Row Name 08/18/24 1046          Sit-Stand Transfer    Sit-Stand [place] (Transfers) modified independence  -JV     Comment, (Sit-Stand Transfer) Pt performed SPS transfer from bed to BSC with CGA, no AD  -JV       Row Name 08/18/24 1046          Gait/Stairs (Locomotion)    [place] Level (Gait) not tested;other (see comments)  Pt states \"I cannot walk\".  -JV     [place] Level (Stairs) not tested  -JV               User Key  (r) = Recorded By, (t) = Taken By, (c) = Cosigned By      Initials Name Provider Type    Heidi Carvalho, PT Physical Therapist                   Obj/Interventions       Row Name 08/18/24 1049          Range of Motion Comprehensive    General Range of Motion bilateral lower extremity ROM WFL  -JV       Row Name 08/18/24 1049          Strength Comprehensive (MMT)    Comment, General Manual Muscle Testing (MMT) Assessment Pt with anti-gravity strength Marshall LE's  -JV       Row Name 08/18/24 1049          Balance    Balance Assessment sitting static balance;standing static balance  -JV     Static Sitting Balance modified independence  -JV     Static Standing Balance modified independence  -JV     Position/Device Used, Standing Balance supported  -JV       Row Name 08/18/24 1049          Sensory Assessment (Somatosensory)    Sensory Assessment (Somatosensory) LE sensation intact  -JV               User " "Key  (r) = Recorded By, (t) = Taken By, (c) = Cosigned By      Initials Name Provider Type    Heidi Carvalho PT Physical Therapist                   Goals/Plan       Row Name 08/18/24 1101          Bed Mobility Goal 1 (PT)    Activity/Assistive Device (Bed Mobility Goal 1, PT) bed mobility activities, all  -JV     Frazer Level/Cues Needed (Bed Mobility Goal 1, PT) independent  -JV     Time Frame (Bed Mobility Goal 1, PT) 1 day  -JV       Row Name 08/18/24 1101          Transfer Goal 1 (PT)    Activity/Assistive Device (Transfer Goal 1, PT) transfers, all  -JV     [place]razer Level/Cues Needed (Transfer Goal 1, PT) modified independence  -JV     Time Frame (Transfer Goal 1, PT) 2 days  -JV       Row Name 08/18/24 1101          Therapy Assessment/Plan (PT)    Planned Therapy Interventions (PT) bed mobility training;home exercise program;neuromuscular re-education;patient/family education;strengthening;transfer training  -JV               User Key  (r) = Recorded By, (t) = Taken By, (c) = Cosigned By      Initials Name Provider Type    Heidi Carvalho PT Physical Therapist                   Clinical Impression       Row Name 08/18/24 1050          Pain    Pretreatment Pain Rating 0/10 - no pain  -JV     Posttreatment Pain Rating 0/10 - no pain  -JV       Row Name 08/18/24 1050          Plan of Care Review    Plan of Care Reviewed With patient  -JV     Progress improving  -JV     Outcome Evaluation PT: Initial evaluation complete, pt awake/alert upon arrival and states she needs to use the bathroom. Pt is oriented to person and place, but reports she lives with her dtr in [place]. Offered to assist pt with amb to bathroom and pt states \"I cannot walk\". BSC was positioned at foot of bed and pt was able to roll R and transfer sup-sit with mod independence. Pt with independent static sitting balance and performed SPS transfer from bed to BSC with CGA and no AD. Pt stood with UE support on bed to allow " "completion of hygiene and then pivoted over to sit on EOB. Pt transferred sit-sup with modified independence. Pt reports several recent falls and primarily using a w/c for mobility. Pt on 3L O2 per NC, states she was awake most of the night \"trying to breathe\".  -JV       Row Name 08/18/24 1050          Therapy Assessment/Plan (PT)    Rehab Potential (PT) good, to achieve stated therapy goals  -JV     Criteria for Skilled Interventions Met (PT) yes;skilled treatment is necessary  -JV     Therapy Frequency (PT) 6 times/wk  -JV     Predicted Duration of Therapy Intervention (PT) 1-2 days  -JV       Row Name 08/18/24 1050          Positioning and Restraints    Pre-Treatment Position in bed  -JV     Post Treatment Position bed  -JV     In Bed supine;call light within reach;encouraged to call for assist  -JV               User Key  (r) = Recorded By, (t) = Taken By, (c) = Cosigned By      Initials Name Provider Type    JOHANNV Heidi Steele, PT Physical Therapist                   Outcome Measures       Row Name 08/18/24 1103 08/18/24 0435       How much help from another person do you currently need...    Turning from your back to your side while in flat bed without using bedrails? 4  -JV 4  -AT    Moving from lying on back to sitting on the side of a flat bed without bedrails? 4  -JV 3  -AT    Moving to and from a bed to a chair (including a wheelchair)? 3  -JV 3  -AT    Standing up from a chair using your arms (e.g., wheelchair, bedside chair)? 4  -JV 3  -AT    Climbing 3-5 steps with a railing? 2  -JV 3  -AT    To walk in hospital room? 2  -JV 3  -AT    AM-PAC 6 Clicks Score (PT) 19  -JV 19  -AT    Highest Level of Mobility Goal 6 --> Walk 10 steps or more  -JV 6 --> Walk 10 steps or more  -AT      Row Name 08/18/24 1103          Functional Assessment    Outcome Measure Options AM-PAC 6 Clicks Basic Mobility (PT)  -JV               User Key  (r) = Recorded By, (t) = Taken By, (c) = Cosigned By      Initials Name Provider " "Type    Heidi Carvalho, KALA Physical Therapist    [facility]                                 Physical Therapy Education       Title: PT OT SLP Therapies (Done)       Topic: Physical Therapy (Done)       Point: Mobility training (Done)       Learning Progress Summary             Patient Acceptance, E,TB, VU by SIXTO at 8/18/2024 1104                         Point: Precautions (Done)       Learning Progress Summary             Patient Acceptance, E,TB, VU by SIXTO at 8/18/2024 1104                                         User Key       Initials Effective Dates Name Provider Type Discipline    SIXTO 06/16/21 -  Heidi Steele, PT Physical Therapist PT                  PT Recommendation and Plan  Planned Therapy Interventions (PT): bed mobility training, home exercise program, neuromuscular re-education, patient/family education, strengthening, transfer training  Plan of Care Reviewed With: patient  Progress: improving  Outcome Evaluation: PT: Initial evaluation complete, pt awake/alert upon arrival and states she needs to use the bathroom. Pt is oriented to person and place, but reports she lives with her dtr in [place]. Offered to assist pt with amb to bathroom and pt states \"I cannot walk\". BSC was positioned at foot of bed and pt was able to roll R and transfer sup-sit with mod independence. Pt with independent static sitting balance and performed SPS transfer from bed to BSC with CGA and no AD. Pt stood with UE support on bed to allow completion of hygiene and then pivoted over to sit on EOB. Pt transferred sit-sup with modified independence. Pt reports several recent falls and primarily using a w/c for mobility. Pt on 3L O2 per NC, states she was awake most of the night \"trying to breathe\".     Time Calculation:   PT Evaluation Complexity  History, PT Evaluation Complexity: 1-2 personal factors and/or comorbidities  Examination of Body Systems (PT Eval Complexity): 1-2 elements  Clinical " Presentation (PT Evaluation Complexity): stable  Clinical Decision Making (PT Evaluation Complexity): low complexity  Overall Complexity (PT Evaluation Complexity): low complexity     PT Charges       Row Name 08/18/24 1105             Time Calculation    Start Time 0900  -JV      Stop Time 0930  -JV      Time Calculation (min) 30 min  -JV                User Key  (r) = Recorded By, (t) = Taken By, (c) = Cosigned By      Initials Name Provider Type    JOHANNV Heidi Steele, PT Physical Therapist                  Therapy Charges for Today       Code Description Service Date Service Provider Modifiers Qty    24047612145 HC PT EVAL LOW COMPLEXITY 2 8/18/2024 Heidi Steele, PT GP 1            PT G-Codes  Outcome Measure Options: AM-PAC 6 Clicks Basic Mobility (PT)  AM-PAC 6 Clicks Score (PT): 19  PT Discharge Summary  Anticipated Discharge Disposition (PT): assisted living, skilled nursing facility    Heidi Steele PT  8/18/2024

## 2024-08-18 NOTE — PROGRESS NOTES
"Hospital Medicine Team    LOS 2 days      Patient Care Team:  Karsten Caldera MD as PCP - General (Family Medicine)      Subjective       Chief Complaint:  f/u shortness of breath    Subjective    Stil with shortness of breath and overall weakness.     Objective       Vital Signs  Temp:  [97.1 °F (36.2 °C)-98.2 °F (36.8 °C)] 98.2 °F (36.8 °C)  Heart Rate:  [68-89] 88  Resp:  [18-24] 20  BP: (140-180)/(61-77) 140/61  Oxygen Therapy  SpO2: 95 %  Pulse Oximetry Type: Continuous  Device (Oxygen Therapy): nasal cannula  Flow (L/min): 3  Oxygen Concentration (%): 40  Flowsheet Rows      Flowsheet Row First Filed Value   Admission Height 152.4 cm (60\") Documented at 08/16/2024 0345   Admission Weight 43.1 kg (95 lb) Documented at 08/16/2024 0108                Physical Exam:  Physical Exam  Vitals reviewed.   Constitutional:       Comments: Chronically ill appearing    Cardiovascular:      Rate and Rhythm: Normal rate.   Pulmonary:      Effort: No respiratory distress.   Abdominal:      General: There is no distension.   Neurological:      Mental Status: She is alert. Mental status is at baseline.           Results Review:    I reviewed the patient's new clinical results.    [x]  Laboratory  [x]  Microbiology  []  Radiology  []  EKG/Telemetry   []  Cardiology/Vascular   []  Pathology  []  Old records  []  Other:      X-rays, labs reviewed personally by physician.    Medication Review:   I have reviewed the patient's current medication list    Scheduled Meds  allopurinol, 200 mg, Oral, Daily  amLODIPine, 5 mg, Oral, Daily  apixaban, 2.5 mg, Oral, BID  arformoterol, 15 mcg, Nebulization, BID - RT  budesonide, 0.5 mg, Nebulization, BID  carvedilol, 6.25 mg, Oral, BID With Meals  docusate sodium, 100 mg, Oral, Nightly  ferrous sulfate, 325 mg, Oral, Daily With Breakfast  folic acid, 1 mg, Oral, Daily  guaiFENesin, 1,200 mg, Oral, BID  pantoprazole, 40 mg, Oral, Daily  piperacillin-tazobactam, 3.375 g, Intravenous, " Q8H  senna-docusate sodium, 2 tablet, Oral, BID  sodium chloride, 10 mL, Intravenous, Q12H        Meds Infusions         Meds PRN  •  acetaminophen  •  ALPRAZolam  •  senna-docusate sodium **AND** polyethylene glycol **AND** bisacodyl **AND** bisacodyl  •  furosemide  •  ipratropium-albuterol  •  labetalol  •  nitroglycerin  •  ondansetron ODT **OR** ondansetron  •  sodium chloride  •  sodium chloride        Assessment / Plan       Active Hospital Problems:  Active Hospital Problems    Diagnosis  POA   • **Respiratory failure [J96.90]  Yes      Resolved Hospital Problems   No resolved problems to display.       Acute on chronic hypoxic and hypercarbic respiratory failure with sepsis, POA  Likely secondary to aspiration PNA/COPD exacerbation   -Now refusing BiPAP.  ABG showed resolution hypercapnia  -ctn home level O2  -Cx with no growth thus far  -Continue Zosyn  -Procalcitonin decreasing       Atrial fibrillation with RVR  Rate stable  Continue Coreg and Eliquis   Monitor      HTN  Stable  CTN home med  Prn IV labetalol for systolic >170       Chronic HFpEF  BNP elevated, but no signs of significant overload  Hold PO lasix with slightly rising Cr     CKD III  Creatinine  baseline 1.2-1.4, current slightlt higher 1,7  Monitor closely      DVT ppx:  Eliquis      Code status:  DNR/DNI    DVT ppx-Eliquis      Plan for disposition: per the  yesterday would need precert to return to SNF and is pending     Electronically signed by Sen Anders DO, 08/18/24, 14:44 EDT.          Note disclaimer: At [facility], we believe that sharing information builds trust and better relationships. You are receiving this note because you recently visited [facility]. It is possible you will see health information before a provider has talked with you about it. This kind of information can be easy to misunderstand. To help you fully understand what it means for your health, we urge you to discuss this note with your  provider.

## 2024-08-19 LAB
ANION GAP SERPL CALCULATED.3IONS-SCNC: 11.6 MMOL/L (ref 5–15)
BASOPHILS # BLD AUTO: 0.01 10*3/MM3 (ref 0–0.2)
BASOPHILS NFR BLD AUTO: 0.2 % (ref 0–1.5)
BUN SERPL-MCNC: 32 MG/DL (ref 8–23)
BUN/CREAT SERPL: 22.5 (ref 7–25)
CALCIUM SPEC-SCNC: 8.5 MG/DL (ref 8.6–10.5)
CHLORIDE SERPL-SCNC: 108 MMOL/L (ref 98–107)
CO2 SERPL-SCNC: 22.4 MMOL/L (ref 22–29)
CREAT SERPL-MCNC: 1.42 MG/DL (ref 0.57–1)
DEPRECATED RDW RBC AUTO: 63.9 FL (ref 37–54)
EGFRCR SERPLBLD CKD-EPI 2021: 36.3 ML/MIN/1.73
EOSINOPHIL # BLD AUTO: 0.2 10*3/MM3 (ref 0–0.4)
EOSINOPHIL NFR BLD AUTO: 3 % (ref 0.3–6.2)
ERYTHROCYTE [DISTWIDTH] IN BLOOD BY AUTOMATED COUNT: 17.6 % (ref 12.3–15.4)
GLUCOSE SERPL-MCNC: 191 MG/DL (ref 65–99)
HCT VFR BLD AUTO: 26.4 % (ref 34–46.6)
HGB BLD-MCNC: 8.2 G/DL (ref 12–15.9)
IMM GRANULOCYTES # BLD AUTO: 0.02 10*3/MM3 (ref 0–0.05)
IMM GRANULOCYTES NFR BLD AUTO: 0.3 % (ref 0–0.5)
LYMPHOCYTES # BLD AUTO: 0.65 10*3/MM3 (ref 0.7–3.1)
LYMPHOCYTES NFR BLD AUTO: 9.9 % (ref 19.6–45.3)
MAGNESIUM SERPL-MCNC: 1.8 MG/DL (ref 1.6–2.4)
MCH RBC QN AUTO: 30.8 PG (ref 26.6–33)
MCHC RBC AUTO-ENTMCNC: 31.1 G/DL (ref 31.5–35.7)
MCV RBC AUTO: 99.2 FL (ref 79–97)
MONOCYTES # BLD AUTO: 0.7 10*3/MM3 (ref 0.1–0.9)
MONOCYTES NFR BLD AUTO: 10.7 % (ref 5–12)
NEUTROPHILS NFR BLD AUTO: 4.98 10*3/MM3 (ref 1.7–7)
NEUTROPHILS NFR BLD AUTO: 75.9 % (ref 42.7–76)
PLATELET # BLD AUTO: 151 10*3/MM3 (ref 140–450)
PMV BLD AUTO: 9.3 FL (ref 6–12)
POTASSIUM SERPL-SCNC: 3.1 MMOL/L (ref 3.5–5.2)
RBC # BLD AUTO: 2.66 10*6/MM3 (ref 3.77–5.28)
SODIUM SERPL-SCNC: 142 MMOL/L (ref 136–145)
WBC NRBC COR # BLD AUTO: 6.56 10*3/MM3 (ref 3.4–10.8)

## 2024-08-19 PROCEDURE — 94664 DEMO&/EVAL PT USE INHALER: CPT

## 2024-08-19 PROCEDURE — 83735 ASSAY OF MAGNESIUM: CPT | Performed by: NURSE PRACTITIONER

## 2024-08-19 PROCEDURE — 99232 SBSQ HOSP IP/OBS MODERATE 35: CPT | Performed by: FAMILY MEDICINE

## 2024-08-19 PROCEDURE — 25010000002 FUROSEMIDE PER 20 MG: Performed by: FAMILY MEDICINE

## 2024-08-19 PROCEDURE — 94799 UNLISTED PULMONARY SVC/PX: CPT

## 2024-08-19 PROCEDURE — 94761 N-INVAS EAR/PLS OXIMETRY MLT: CPT

## 2024-08-19 PROCEDURE — 80048 BASIC METABOLIC PNL TOTAL CA: CPT | Performed by: INTERNAL MEDICINE

## 2024-08-19 PROCEDURE — 99222 1ST HOSP IP/OBS MODERATE 55: CPT | Performed by: INTERNAL MEDICINE

## 2024-08-19 PROCEDURE — 25010000002 PIPERACILLIN SOD-TAZOBACTAM PER 1 G: Performed by: INTERNAL MEDICINE

## 2024-08-19 PROCEDURE — 85025 COMPLETE CBC W/AUTO DIFF WBC: CPT | Performed by: INTERNAL MEDICINE

## 2024-08-19 RX ORDER — POTASSIUM CHLORIDE 1500 MG/1
40 TABLET, EXTENDED RELEASE ORAL ONCE
Status: COMPLETED | OUTPATIENT
Start: 2024-08-19 | End: 2024-08-19

## 2024-08-19 RX ORDER — FUROSEMIDE 10 MG/ML
40 INJECTION INTRAMUSCULAR; INTRAVENOUS ONCE
Status: COMPLETED | OUTPATIENT
Start: 2024-08-19 | End: 2024-08-19

## 2024-08-19 RX ADMIN — FERROUS SULFATE TAB 325 MG (65 MG ELEMENTAL FE) 325 MG: 325 (65 FE) TAB at 08:48

## 2024-08-19 RX ADMIN — SENNOSIDES AND DOCUSATE SODIUM 2 TABLET: 50; 8.6 TABLET ORAL at 08:48

## 2024-08-19 RX ADMIN — BUDESONIDE 0.5 MG: 0.5 INHALANT RESPIRATORY (INHALATION) at 08:25

## 2024-08-19 RX ADMIN — SENNOSIDES AND DOCUSATE SODIUM 2 TABLET: 50; 8.6 TABLET ORAL at 22:53

## 2024-08-19 RX ADMIN — ACETAMINOPHEN 650 MG: 325 TABLET ORAL at 22:52

## 2024-08-19 RX ADMIN — Medication 10 ML: at 22:54

## 2024-08-19 RX ADMIN — PIPERACILLIN SODIUM AND TAZOBACTAM SODIUM 3.38 G: 3; .375 INJECTION, POWDER, LYOPHILIZED, FOR SOLUTION INTRAVENOUS at 10:20

## 2024-08-19 RX ADMIN — CARVEDILOL 6.25 MG: 3.12 TABLET, FILM COATED ORAL at 08:48

## 2024-08-19 RX ADMIN — Medication 10 ML: at 08:48

## 2024-08-19 RX ADMIN — POTASSIUM CHLORIDE 40 MEQ: 1500 TABLET, EXTENDED RELEASE ORAL at 18:30

## 2024-08-19 RX ADMIN — GUAIFENESIN 1200 MG: 600 TABLET, EXTENDED RELEASE ORAL at 22:53

## 2024-08-19 RX ADMIN — APIXABAN 2.5 MG: 2.5 TABLET, FILM COATED ORAL at 08:48

## 2024-08-19 RX ADMIN — AMLODIPINE BESYLATE 5 MG: 5 TABLET ORAL at 08:48

## 2024-08-19 RX ADMIN — ARFORMOTEROL TARTRATE 15 MCG: 15 SOLUTION RESPIRATORY (INHALATION) at 08:25

## 2024-08-19 RX ADMIN — PIPERACILLIN SODIUM AND TAZOBACTAM SODIUM 3.38 G: 3; .375 INJECTION, POWDER, LYOPHILIZED, FOR SOLUTION INTRAVENOUS at 17:36

## 2024-08-19 RX ADMIN — ALLOPURINOL 200 MG: 100 TABLET ORAL at 08:48

## 2024-08-19 RX ADMIN — ARFORMOTEROL TARTRATE 15 MCG: 15 SOLUTION RESPIRATORY (INHALATION) at 19:43

## 2024-08-19 RX ADMIN — DOCUSATE SODIUM 100 MG: 100 CAPSULE, LIQUID FILLED ORAL at 22:53

## 2024-08-19 RX ADMIN — GUAIFENESIN 1200 MG: 600 TABLET, EXTENDED RELEASE ORAL at 08:48

## 2024-08-19 RX ADMIN — CARVEDILOL 6.25 MG: 3.12 TABLET, FILM COATED ORAL at 17:29

## 2024-08-19 RX ADMIN — BUDESONIDE 0.5 MG: 0.5 INHALANT RESPIRATORY (INHALATION) at 19:50

## 2024-08-19 RX ADMIN — ALPRAZOLAM 0.25 MG: 0.25 TABLET ORAL at 22:52

## 2024-08-19 RX ADMIN — FUROSEMIDE 40 MG: 10 INJECTION, SOLUTION INTRAVENOUS at 13:19

## 2024-08-19 RX ADMIN — PIPERACILLIN SODIUM AND TAZOBACTAM SODIUM 3.38 G: 3; .375 INJECTION, POWDER, LYOPHILIZED, FOR SOLUTION INTRAVENOUS at 02:30

## 2024-08-19 RX ADMIN — PANTOPRAZOLE SODIUM 40 MG: 40 TABLET, DELAYED RELEASE ORAL at 08:48

## 2024-08-19 RX ADMIN — FOLIC ACID 1 MG: 1 TABLET ORAL at 08:48

## 2024-08-19 RX ADMIN — APIXABAN 2.5 MG: 2.5 TABLET, FILM COATED ORAL at 22:53

## 2024-08-19 RX ADMIN — POTASSIUM CHLORIDE 40 MEQ: 1500 TABLET, EXTENDED RELEASE ORAL at 22:52

## 2024-08-19 NOTE — CONSULTS
Date of Hospital Visit: 2024  Date of consult: 24  Encounter Provider: Surinder Yoo MD  Place of Service: [facility] CARDIOLOGY  Patient Name: Alberta Flores  :1940  Referral Provider: Allen Russ MD    Chief complaint: unresponsive     Reason for consult: CHF    History of Present Illness Alberta Flores is an 84-year-old patient of [facility] with a history of asthma, CHF, COPD, GERD, nonobstructive CAD, PAF on Eliquis, NSVT, PAF, PFO former smoker , factor V Leiden mutation, colon cancer status post resection complicated by fascia dehiscence in , renal insufficiency and hypertension.      Pt had a cardiac cath in  showed nonobstructive CAD with false+ stress test, LVEF 45%,       Pt was seen in  by [facility] and no changes were made.     Patient was last seen by [facility] on 8/3/2022 for follow-up of her heart failure with preserved EF. Pt has had multiple admission for COPD. Pt was admitted in 2022 for chest pain and had a lo risk stress test.  An echo then showed LVEF of 59%    Patient reported took a dose of metolazone 2 days ago for lower leg edema with a couple pound weight gain.  Patient reported chronic lower leg edema is worse with dependency.  Patient denied abdominal swelling chest discomfort, palpitations, orthopnea or PND.  Patient did report more dizziness lately describes vertigo symptoms.  She denied any syncopal episodes or recent falls BP was normal in the office.  Patient does report some exertional dyspnea.  Patient was to have BMP, CBC, BMP with her PCP in a week.  No other changes were made to her medications        Patient presented to [facility] ER on 2024 from the  after being found unresponsive.  Per EMS patient wears oxygen 3 L nasal cannula at baseline.  Patient had a recent admission to [facility] for acute COPD exacerbation in the ER she was noted to be hypoxic and  hypercarbic.  Patient was placed on BiPAP and given DuoNebs x 2, IV Lasix and IV steroids.  She denied any chest or abdominal pain or nausea. In ER, troponin T 68, proBNP 2,624, CR 1.24, WBC 17.31, hemoglobin 10.5, D dimer 1.83, INR 1.17, UA with trace bacteria , Lactate 0.7, procal 0.22, EKG showed sinus tach with a rate of 111, right bundle branch block and a ventricular conduction delay.  Left ventricular hypertrophy with secondary repolarization abnormalities. Pt admitted for respiratory failure.          ECHO 3/19/24    Summary:                 The estimated left ventricular ejection fraction is 45-50% with no clear regional wall motion abnormalities.                            There is mild, global, left ventricular hypokinesis.                            There is grade I left ventricular diastolic dysfunction (impaired relaxation).                            The right ventricular chamber size and systolic function are within normal limits.                            At least mild aortic stenosis is visualized.                            No significant atrioventricular valvular abnormalities are identified.                            There is no evidence of a pericardial effusion.   Recommendations: Consider TAVR CTA to further evaluate aortic stenosis if clinically appropriate.               Stress test 4/19/22      Summary    Normal pharmacological stress SPECT Myocardial Perfusion Imaging.    No evidence of stress induced myocardial ischemia or infarction.    There is a small sized mild severity fixed perfusion defect(s) of the    mid-anteroseptal, apical septal wall with normal wall motion/thickening in    this region consistent with soft-tissue attenuation.      Summary of LV Function    Global LV systolic function is normal .    Gated wall motion shows normal left ventricular wall motion and systolic    wall thickening in all left ventricular segments including the anteroseptal    wall.    Gated SPECT  analysis demonstrates a post stress ejection fraction of 70%.     Past Medical History:   Diagnosis Date    Asthma     CHF (congestive heart failure)     COPD (chronic obstructive pulmonary disease)     GERD (gastroesophageal reflux disease)     Hypertension        History reviewed. No pertinent surgical history.    Medications Prior to Admission   Medication Sig Dispense Refill Last Dose    acetaminophen (TYLENOL) 500 MG tablet Take 2 tablets by mouth 3 (Three) Times a Day As Needed for Mild Pain.   8/15/2024    allopurinol (ZYLOPRIM) 100 MG tablet Take 2 tablets by mouth Daily.   8/15/2024    ALPRAZolam (XANAX) 0.25 MG tablet Take 1 tablet by mouth 2 (Two) Times a Day As Needed for Anxiety.   8/15/2024    AmLODIPine Besylate (NORVASC PO) Take 5 mg by mouth Daily.   8/15/2024    apixaban (ELIQUIS) 2.5 MG tablet tablet Take 1 tablet by mouth 2 (Two) Times a Day.   8/15/2024    baclofen (LIORESAL) 10 MG tablet Take 0.25 tablets by mouth Every Night.   8/15/2024    budesonide (PULMICORT) 0.5 MG/2ML nebulizer solution Take 2 mL by nebulization 2 (Two) Times a Day.   8/15/2024    carvedilol (COREG) 6.25 MG tablet Take 1 tablet by mouth 2 (Two) Times a Day With Meals.   8/15/2024    docusate sodium (COLACE) 100 MG capsule Take 1 capsule by mouth Every Night. Hold for greater than 2 BM a day   8/15/2024    ferrous sulfate 325 (65 FE) MG tablet Take 1 tablet by mouth Daily With Breakfast.   8/15/2024    folic acid (FOLVITE) 1 MG tablet Take 1 tablet by mouth Daily.   8/15/2024    formoterol (PERFOROMIST) 20 MCG/2ML nebulizer solution Take 2 mL by nebulization 2 (Two) Times a Day.   8/15/2024    furosemide (LASIX) 20 MG tablet Take 1 tablet by mouth Daily.   8/15/2024    GUAIFENESIN ER PO Take 1,200 mg by mouth 2 (Two) Times a Day.   8/15/2024    ipratropium-albuterol (DUO-NEB) 0.5-2.5 mg/mL nebulizer Take 3 mL by nebulization Every 4 (Four) Hours As Needed for Wheezing.   8/15/2024    liver oil-zinc oxide (DESITIN) 40 %  ointment Apply 1-2 Applications topically to the appropriate area as directed 2 (Two) Times a Day.   8/15/2024    pantoprazole (PROTONIX) 40 MG EC tablet Take 1 tablet by mouth Daily.   8/15/2024    vitamin B-12 (CYANOCOBALAMIN) 100 MCG tablet Take 0.5 tablets by mouth Daily.   8/15/2024    loperamide (IMODIUM) 2 MG capsule Take 1 capsule by mouth 4 (Four) Times a Day As Needed for Diarrhea.   More than a month       Current Meds  Scheduled Meds:allopurinol, 200 mg, Oral, Daily  amLODIPine, 5 mg, Oral, Daily  apixaban, 2.5 mg, Oral, BID  arformoterol, 15 mcg, Nebulization, BID - RT  budesonide, 0.5 mg, Nebulization, BID  carvedilol, 6.25 mg, Oral, BID With Meals  docusate sodium, 100 mg, Oral, Nightly  ferrous sulfate, 325 mg, Oral, Daily With Breakfast  folic acid, 1 mg, Oral, Daily  furosemide, 40 mg, Intravenous, BID  guaiFENesin, 1,200 mg, Oral, BID  pantoprazole, 40 mg, Oral, Daily  piperacillin-tazobactam, 3.375 g, Intravenous, Q8H  senna-docusate sodium, 2 tablet, Oral, BID  sodium chloride, 10 mL, Intravenous, Q12H      Continuous Infusions:   PRN Meds:.  acetaminophen    ALPRAZolam    senna-docusate sodium **AND** polyethylene glycol **AND** bisacodyl **AND** bisacodyl    furosemide    ipratropium-albuterol    labetalol    nitroglycerin    ondansetron ODT **OR** ondansetron    sodium chloride    sodium chloride    Allergies as of 08/16/2024 - Reviewed 08/16/2024   Allergen Reaction Noted    Iodine Unknown - Low Severity 01/03/2013    Fluticasone Other (See Comments) 12/09/2016    Sulfa antibiotics Rash and Other (See Comments) 06/13/2013    Acetaminophen-codeine Unknown - Low Severity 12/21/2023    Ceftin [cefuroxime axetil]  01/02/2017    Codeine  01/02/2017    Fluconazole Other (See Comments) 07/08/2021    Fluocinolone Unknown - Low Severity 09/05/2012    Fluocinolone acetonide Unknown - Low Severity 09/05/2012    Nuts Unknown - Low Severity 01/01/2018    Tylenol [acetaminophen]  01/02/2017    Vioxx  "[rofecoxib]  01/02/2017    Atorvastatin Other (See Comments) 12/02/2022    Enoxaparin Other (See Comments) and Hives 06/15/2006    Shellfish allergy Unknown - Low Severity 01/12/2018       Social History     Socioeconomic History    Marital status: Single   Tobacco Use    Smoking status: Never   Vaping Use    Vaping status: Never Used   Substance and Sexual Activity    Alcohol use: No    Drug use: Never    Sexual activity: Defer       History reviewed. No pertinent family history.    REVIEW OF SYSTEMS:   All systems reviewed and pertinent positives include in HPI otherwise negative review of systems.       Objective:   Temp:  [96.9 °F (36.1 °C)-98.9 °F (37.2 °C)] 98.9 °F (37.2 °C)  Heart Rate:  [] 84  Resp:  [18-30] 18  BP: (137-168)/(61-79) 168/70  Body mass index is 22.13 kg/m².  Flowsheet Rows      Flowsheet Row First Filed Value   Admission Height 152.4 cm (60\") Documented at 08/16/2024 0345   Admission Weight 43.1 kg (95 lb) Documented at 08/16/2024 0108          Vitals:    08/19/24 0734   BP: 168/70   Pulse: 84   Resp: 18   Temp: 98.9 °F (37.2 °C)   SpO2: 96%       General Appearance:    Alert, cooperative, chronically sick looking on supplemental oxygen   Head:    Normocephalic, without obvious abnormality, atraumatic   Eyes:            Lids and lashes normal, conjunctivae and sclerae normal, no   icterus, no pallor, corneas clear, PERRLA   Ears:    Ears appear intact with no abnormalities noted   Throat:   No oral lesions, no thrush, oral mucosa moist   Neck:   No adenopathy, supple, trachea midline, no thyromegaly, no   carotid bruit, no JVD   Back:     No kyphosis present, no scoliosis present, no skin lesions, erythema or scars, no tenderness to percussion or palpation, range of motion normal   Lungs:     Clear to auscultation,respirations regular, even and unlabored    Heart:    Regular rhythm and normal rate, normal S1 and S2, systolic murmur, no gallop, no rub, no click   Chest Wall:    No " abnormalities observed   Abdomen:     Normal bowel sounds, no masses, no organomegaly, soft nontender, nondistended, no guarding, no rebound  tenderness   Extremities:   Moves all extremities well, no edema, no cyanosis, no redness   Pulses:   Pulses palpable and equal bilaterally. Normal radial, carotid, femoral, dorsalis pedis and posterior tibial pulses bilaterally. Normal abdominal aorta   Skin:  Neurology:   Psychiatric:   No bleeding, bruising or rash   Normal speech and cranial nerve exam, no focal deficit   Alert and oriented x 3, normal mood and affect             Review of Data:      Results from last 7 days   Lab Units 08/18/24  0110 08/17/24  0626 08/16/24  0136   SODIUM mmol/L 140   < > 139   POTASSIUM mmol/L 3.7   < > 4.3   CHLORIDE mmol/L 108*   < > 107   CO2 mmol/L 22.0   < > 23.3   BUN mg/dL 41*   < > 27*   CREATININE mg/dL 1.72*   < > 1.24*   CALCIUM mg/dL 8.7   < > 9.0   BILIRUBIN mg/dL  --   --  0.3   ALK PHOS U/L  --   --  180*   ALT (SGPT) U/L  --   --  18   AST (SGOT) U/L  --   --  21   GLUCOSE mg/dL 102*   < > 193*    < > = values in this interval not displayed.     Results from last 7 days   Lab Units 08/16/24  0350 08/16/24  0136   HSTROP T ng/L 120* 68*     @LABRCNTbnp@  Results from last 7 days   Lab Units 08/18/24  0110 08/17/24  0626 08/16/24  0136   WBC 10*3/mm3 7.48 5.32 17.31*   HEMOGLOBIN g/dL 8.3* 8.0* 10.5*   HEMATOCRIT % 26.5* 25.2* 34.3   PLATELETS 10*3/mm3 164 167 260     Results from last 7 days   Lab Units 08/16/24  0136   INR  1.17*         @LABNTIP(chol,trig,hdl,ldl)                I personally viewed and interpreted the patient's EKG/Telemetry data  )   Respiratory failure        Assessment and Plan:    Acute on chronic hypoxemic/hypercarbic respiratory failure  Bilateral pneumonia on Zosyn  Chronic diastolic congestive heart failure   COPD  Elevated troponin from hypoxemia  Acute on CKD  Aortic valve stenosis-reported at least mild on echo done in March 2024.  She has  corresponding systolic murmur.  Paroxysmal atrial fibrillation on chronic anticoagulation  Home cardiac related medications include amlodipine, Eliquis 2.5 twice daily, carvedilol, Lasix 20 mg p.o. daily  Presyncope/syncope with frequent falls    Current breathing problems probably from pneumonia.  She was euvolemic on exam.  Chronically sick looking and debilitated.  She reports frequent falls from passing out.  She reports walking using a walker but she feels unsafe.    Recommend PT evaluation and orthostatic vital signs  Slightly worsened BUN/creatinine from baseline likely from overdiuresis.  Hold diuretic and monitor BUN/creatinine and restart diuretic as needed for worsening breathing, leg swelling.     She does not need any further inpatient cardiac specific testing or treatment.  Continue home medications with monitoring.    I have discussed patient with Dr. Tee.     Surinder Yoo MD  08/19/24  08:38 EDT.      Time spent in reviewing chart, discussion and examination:

## 2024-08-19 NOTE — SIGNIFICANT NOTE
08/19/24 1331   OTHER   Discipline physical therapist   Rehab Time/Intention   Session Not Performed patient/family declined treatment  (attempted to see x2, pt declines despite encouragement/education.)

## 2024-08-19 NOTE — CASE MANAGEMENT/SOCIAL WORK
Continued Stay Note  KOJO [place]     Patient Name: Alberta Flores  MRN: [id number]  Today's Date: 8/19/2024    Admit Date: 8/16/2024    Plan: Return to [facility]   Discharge Plan       Row Name 08/19/24 1318       Plan    Plan Return to [facility]    Patient/Family in Agreement with Plan yes    Plan Comments Peer Jill, precert has been obtained. Dr Tee notified, anticipate dc tomorrow. Spoke with patients daughter, Debby, to update.  Spoke with Yari Richardson to update dc tomorrow. Anticipate EMS transport. CM will continue to follow. CALL REPORT TO  [phone].      Row Name 08/19/24 1055       Plan    Plan [facility]    Patient/Family in Agreement with Plan yes    Plan Comments Spoke with Tayler Richardson who states requested precert to have patient return to facility skilled level of care, but can accept back before precert is obtained and patient will be long term care in their Memory Care unit. Spoke with Jill who verifies precert was initiated yesterday. If patient  returns to facility prior to obtaining precert, Jill will notify faacility once precert is obtained. Message to update Linsey MAST and Dr Tee. CM will continue to follow.                   Discharge Codes    No documentation.                 Expected Discharge Date and Time       Expected Discharge Date Expected Discharge Time    Aug 17, 2024               Giacomo De La Torre RN

## 2024-08-19 NOTE — NURSING NOTE
"Informed pt MD suggestion to move to ICU for Bipap    Pt stated \"hell no I'm not wearing that mask\" Dom, RN also at bedside    Dr. MATHEWS Notified, will continue with diuretic and monitor  "

## 2024-08-19 NOTE — CASE MANAGEMENT/SOCIAL WORK
Post-Acute Authorization Submission      Post Acute Pre-Cert Documentation  Request Submitted by Facility - Type:: Hospital  Post-Acute Authorization Type Submitted:: SNF  Date Post Acute Pre-Cert Inititated per Facility: 08/18/24  Date Post Acute Pre-Cert Completed: 08/19/24  Accepting Facility: [facility] Discharge Date Requested: 08/18/24  All Clinicals Submitted?: Yes  Had Accepting Facility at Time of Submission: Yes  Response Received from Insurance?: Approval  Response Communicated to:: , Accepting Facility Liaison, Accepting Facility Auth Department  Authorization Number:: [id number]  Post Acute Pre-Cert Initiated Comment: VALUID TO ADMIT UPTO 8/22/2024.              Pravin Ballard, PCT

## 2024-08-19 NOTE — PLAN OF CARE
Goal Outcome Evaluation:  Plan of Care Reviewed With: patient        Progress: improving  Outcome Evaluation: Patient here for shortness of breath. Labs this am after encouragement. Patient not wanting to get out of bed to the chair at this time. Anticipate to [facility] if tomorrow labs are stable. No family at bedside today.

## 2024-08-19 NOTE — CASE MANAGEMENT/SOCIAL WORK
Post-Acute Authorization Submission      Post Acute Pre-Cert Documentation  Request Submitted by Facility - Type:: Hospital  Post-Acute Authorization Type Submitted:: SNF  Date Post Acute Pre-Cert Inititated per Facility: 08/18/24  Accepting Facility: [facility] Discharge Date Requested: 08/18/24  All Clinicals Submitted?: Yes  Had Accepting Facility at Time of Submission: Yes  Response Communicated to::   Authorization Number:: PENDING [id number]              NURIA Serrano

## 2024-08-19 NOTE — PROGRESS NOTES
"SERVICE: [facility] HOSPITALIST    CONSULTANTS: None    CHIEF COMPLAINT:  SOB    SUBJECTIVE: Patient seen and examined at bedside. She admits to some SOB; denies chest or abdominal pain; refused labs this AM; says she wants her \"PCP\" to draw them     OBJECTIVE:    Physical exam is largely unchanged from previous exam, except where documented below, examination is accurate as of 8/19/2024    Physical Exam:  General: Elderly female; lying in bed; on oxygen; mildly confused    HENT: Head is atraumatic, normocephalic  Cardiovascular: RRR, S1-S2  Respiratory: Coarse and diminished breat sounds bilaterally; no wheezing   Abdominal/GI: Soft, nontender, bowel sounds present. No rebound or guarding present.   Extremities: No peripheral edema noted.   Musculoskeletal: Spontaneous movement of bilateral upper and lower extremities against gravity noted. No signs of injury or deformity noted.   Skin: Warm and dry.       /70 (Patient Position: Lying)   Pulse 84   Temp 98.9 °F (37.2 °C) (Oral)   Resp 18   Ht 152.4 cm (60\")   Wt 51.4 kg (113 lb 5.1 oz)   SpO2 96%   BMI 22.13 kg/m²     MEDS/LABS REVIEWED AND ORDERED    allopurinol, 200 mg, Oral, Daily  amLODIPine, 5 mg, Oral, Daily  apixaban, 2.5 mg, Oral, BID  arformoterol, 15 mcg, Nebulization, BID - RT  budesonide, 0.5 mg, Nebulization, BID  carvedilol, 6.25 mg, Oral, BID With Meals  docusate sodium, 100 mg, Oral, Nightly  ferrous sulfate, 325 mg, Oral, Daily With Breakfast  folic acid, 1 mg, Oral, Daily  furosemide, 40 mg, Intravenous, BID  guaiFENesin, 1,200 mg, Oral, BID  pantoprazole, 40 mg, Oral, Daily  piperacillin-tazobactam, 3.375 g, Intravenous, Q8H  senna-docusate sodium, 2 tablet, Oral, BID  sodium chloride, 10 mL, Intravenous, Q12H          LAB/DIAGNOSTICS:    Lab Results (last 24 hours)       Procedure Component Value Units Date/Time    Blood Culture - Blood, Arm, Right [[id number]]  (Normal) Collected: 08/16/24 0200    Specimen: Blood " from Arm, Right Updated: 08/19/24 0215     Blood Culture No growth at 3 days    Blood Culture - Blood, Arm, Left [[id number]]  (Normal) Collected: 08/16/24 0136    Specimen: Blood from Arm, Left Updated: 08/19/24 0145     Blood Culture No growth at 3 days    Blood Gas, Arterial - [[id number]]  (Abnormal) Collected: 08/18/24 2150    Specimen: Arterial Blood Updated: 08/18/24 2158     Site Left Brachial     Kt's Test N/A     pH, Arterial 7.274 pH units      Comment: 84 Value below reference range        pCO2, Arterial 52.5 mm Hg      Comment: 83 Value above reference range        pO2, Arterial 61.5 mm Hg      Comment: 84 Value below reference range        HCO3, Arterial 24.3 mmol/L      Base Excess, Arterial -2.8 mmol/L      Comment: 84 Value below reference range        O2 Saturation, Arterial 89.2 %      Comment: 84 Value below reference range        Hemoglobin, Blood Gas 10.3 g/dL      Comment: 84 Value below reference range        Temperature 37.0     Barometric Pressure for Blood Gas 739 mmHg      Modality Nasal Cannula     Flow Rate 3.0 lpm      Collected by [id number]     Comment: Meter: [id number]     :  [id number]        pCO2, Temperature Corrected 52.5 mm Hg      pH, Temp Corrected 7.274 pH Units      pO2, Temperature Corrected 61.5 mm Hg           ECG 12 Lead Dyspnea   Final Result   HEART SKSI=836  bpm   RR Pirhujnp=184  ms   VA Otdrfjkb=592  ms   P Horizontal Axis=174  deg   P Front Axis=201  deg   QRSD Ejifedti=173  ms   QT Ahxljtyg=195  ms   KToJ=973  ms   QRS Axis=-53  deg   T Wave Axis=127  deg   - ABNORMAL ECG -   Sinus or ectopic atrial tachycardia   LVH with secondary repolarization abnormality   ST elevation secondary to IVCD   NO PRIOR TRACING AVAILABLE FOR COMPARISON   Electronically Signed By: Joshua Rahman ([facility]) 2024-08-16 14:28:08   Date and Time of Study:2024-08-16 01:13:44          XR Chest 1 View    Result Date: 8/18/2024  Impression: Improved interstitial disease with increased  right basilar airspace disease and increased small to moderate bilateral pleural effusions. Electronically Signed: Sandip Nieto MD  8/18/2024 10:51 PM EDT  Workstation ID: [id number]       ASSESSMENT/PLAN:    Acute on chronic hypoxic and hypercarbic respiratory failure with sepsis, POA  Likely secondary to aspiration PNA/COPD exacerbation   -Now refusing BiPAP.  ABG showed resolution hypercapnia  -Cx with no growth thus far  -Continue Zosyn for now   -Procalcitonin decreasing   -CXR from yesterday reviewed; noted bilateral effusions; give IV lasix x 1 today  -repeat CXR in AM        Atrial fibrillation with RVR  Rate stable  Continue Coreg and Eliquis   Monitor      HTN  BP fairly well controlled   Continue home meds   Prn IV labetalol for systolic >170       Acute on Chronic HFpEF  BNP elevated, CXR from yesterday with bilateral effusions  S/p IV lasix yesterday; give additional dose today  Repeat CXR in AM      CKD III  Creatinine  baseline 1.2-1.4, was 1.7 yesterday  Patient refused labs today; will attempt to recheck later today or in AM   Monitor closely      DVT ppx:  Eliquis      Code status:  DNR/DNI     DVT ppx-Eliquis    Please note portions of this assessment/plan may have been copied and pasted, but I have personally seen this patient and reviewed each line of this assessment and plan for accuracy and made updates to reflect my necessary changes on 8/19/2024      PLAN FOR DISPOSITION: Plan to return to [place] pending improvement in respiratory status/precert     Jalil Tee DO  08/19/24  08:41 EDT    At [facility], we believe that sharing information builds trust and better relationships. You are receiving this note because you recently visited [facility]. It is possible you will see health information before a provider has talked with you about it. This kind of information can be easy to misunderstand. To help you fully understand what it means for your health, we urge you  "to discuss this note with your provider.    \"Dictated utilizing Dragon dictation\"  "

## 2024-08-19 NOTE — PLAN OF CARE
Goal Outcome Evaluation:           Progress: no change  Outcome Evaluation: pt improved over night- no complaints, up to BSC, lasix given- see MAR, titrate between 3-5 L NC overnight while asleep, on tele, started lasix x2 daily.

## 2024-08-19 NOTE — CASE MANAGEMENT/SOCIAL WORK
Continued Stay Note  KOJO Martin     Patient Name: Alberta Flores  MRN: [id number]  Today's Date: 8/19/2024    Admit Date: 8/16/2024    Plan: [facility]   Discharge Plan       Row Name 08/19/24 1055       Plan    Plan [facility] Care    Patient/Family in Agreement with Plan yes    Plan Comments Spoke with Tayler Richardson who states requested precert to have patient return to facility skilled level of care, but can accept back before precert is obtained and patient will be long term care in their Memory Care unit. Spoke with Jill who verifies precert was initiated yesterday. If patient  returns to facility prior to obtaining precert, Jill will notify faacility once precert is obtained. Message to update Linsey MAST and Dr Tee. JOSE A will continue to follow.                   Discharge Codes    No documentation.                 Expected Discharge Date and Time       Expected Discharge Date Expected Discharge Time    Aug 17, 2024               Giacomo De La Torre RN

## 2024-08-19 NOTE — SIGNIFICANT NOTE
Pt seems more confused than last two nights. Breath sounds worse, taking Oxygen off. Nurse advised, orders received for ABG's.

## 2024-08-19 NOTE — PROGRESS NOTES
Adult Nutrition  Assessment/PES    Patient Name:  Alberta Flores  YOB: 1940  MRN: [id number]  Admit Date:  8/16/2024    Assessment Date:  8/19/2024    Comments:  Pt seen for age/BMI.    Po intake 61% ave of meals.    Pt meets criteria for:    Severe chronic disease related malnutrition related to chronic hypoxic resp failure, HF as evidenced by severe muscle & fat loss on exam, less 75% est energy requirements >= 1 month     Will add Boost Plus twice per day to provide additional source of easily consumable nutrition    Will cont to follow and monitor.      Reason for Assessment       Row Name 08/19/24 1052          Reason for Assessment    Reason For Assessment identified at risk by screening criteria  age/BMI     Diagnosis pulmonary disease  Acute Hypoxic Resp Failure , HTN, Acute on HF hx CKD                    Nutrition/Diet History       Row Name 08/19/24 1053          Nutrition/Diet History    Typical Intake (Food/Fluid/EN/PN) Pt in bed w  present. Food allergies noted. Denies issue chew/swallowing. Reports likes straw supplement. willing to drink 1-2 vanilla per day. Feels like her appetite has been ok.     Functional Status nonambulatory  wheelchair reported                    Labs/Tests/Procedures/Meds       Row Name 08/19/24 1054          Labs/Procedures/Meds    Lab Results Reviewed reviewed     Lab Results Comments glu 102-193, BUn 41/creat 1.7 h        Diagnostic Tests/Procedures    Diagnostic Test/Procedure Reviewed reviewed        Medications    Pertinent Medications Reviewed reviewed     Pertinent Medications Comments iron, folic acid                    Physical Findings       Row Name 08/19/24 1055          Physical Findings    Overall Physical Appearance muscle/fat loss on exam                    Estimated/Assessed Needs - Anthropometrics       Row Name 08/19/24 1055          Anthropometrics    Weight for Calculation 51.4 kg (113 lb 5.1 oz)        Estimated/Assessed Needs     Additional Documentation Estimated Calorie Needs (Group);Fluid Requirements (Group);Protein Requirements (Group)        Estimated Calorie Needs    Estimated Calorie Requirement (kcal/day) 1542 kcal ( 30 kcal/kg)     Estimated Calorie Need Method kcal/kg        Protein Requirements    Est Protein Requirement Amount (gms/kg) 1.2 gm protein  62-72 gm pro (1.2-1.4 gm/kg pro)        Fluid Requirements    Estimated Fluid Requirement Method RDA Method  1542ml                    Nutrition Prescription Ordered       Row Name 08/19/24 1055          Nutrition Prescription PO    Current PO Diet Regular                    Evaluation of Received Nutrient/Fluid Intake       Row Name 08/19/24 1056          Fluid Intake Evaluation    Oral Fluid (mL) 1013  ave x 3, 66%        PO Evaluation    Number of Days PO Intake Evaluated 3 days     Number of Meals 9     % PO Intake 61                    Malnutrition Severity Assessment       Row Name 08/19/24 1056          Malnutrition Severity Assessment    Malnutrition Type Chronic Disease - Related Malnutrition        Insufficient Energy Intake     Insufficient Energy Intake  <75% of est. energy requirement for > or equal to 1 month        Muscle Loss    [place] Region Severe - deep hollowing/scooping, lack of muscle to touch, facial bones well defined     Clavicle Bone Region Severe - protruding prominent bone     Acromion Bone Region Severe - squared shoulders, bones, and acromion process protrusion prominent     Patellar Region Moderate - patella more prominent, less muscle definition around patella     Anterior Thigh Region None     Posterior Calf Region --  difficult to assess pt uses Wheelchair, cannot engage        Fat Loss    Orbital Region  Moderate -  somewhat hollowness, slightly dark circles     Upper Arm Region Severe - mostly skin, very little space between folds, fingers touch        Criteria Met (Must meet criteria for severity in at least 2 of these categories: M Wasting, Fat  Loss, Fluid, Secondary Signs, Wt. Status, Intake)    Patient meets criteria for  Severe Malnutrition                     Problem/Interventions:   Problem 1       Row Name 08/19/24 1057          Nutrition Diagnoses Problem 1    Problem 1 Other (comment)  Severe chronic disease related malnutrition related to chronic hypoxic resp failure, HF as evidenced by severe muscle & fat loss on exam, less 75% est energy requirements >= 1 month.                          Intervention Goal       Row Name 08/19/24 1058          Intervention Goal    General Meet nutritional needs for age/condition     PO Continue positive trend;PO intake (%)     PO Intake % 75 %                    Nutrition Intervention       Row Name 08/19/24 1058          Nutrition Intervention    RD/Tech Action Encourage intake;Follow Tx progress;Recommend/ordered     Recommended/Ordered Supplement                    Nutrition Prescription       Row Name 08/19/24 1058          Nutrition Prescription PO    PO Prescription Begin/change supplement     Supplement Boost Plus     Supplement Frequency 2 times a day                    Education/Evaluation       Row Name 08/19/24 1059          Education    Education Education topics;Provided education regarding;Advised regarding habits/behavior  Edu on reg diet order. Edu on ONS- Boost. Edu on need pro each meal due to muscle/fat loss on exam.     Education Topics Protein        Monitor/Evaluation    Monitor Per protocol;I&O;PO intake;Supplement intake;Pertinent labs;Weight;Symptoms     Education Follow-up Other (comment)  pt agreeable and verbalize understanding                     Electronically signed by:  Noelle Lugo RD  08/19/24 10:59 EDT

## 2024-08-20 ENCOUNTER — APPOINTMENT (OUTPATIENT)
Dept: GENERAL RADIOLOGY | Facility: HOSPITAL | Age: 84
DRG: 871 | End: 2024-08-20
Payer: MEDICARE

## 2024-08-20 VITALS
RESPIRATION RATE: 20 BRPM | TEMPERATURE: 97.6 F | DIASTOLIC BLOOD PRESSURE: 66 MMHG | BODY MASS INDEX: 22.25 KG/M2 | SYSTOLIC BLOOD PRESSURE: 139 MMHG | HEIGHT: 60 IN | HEART RATE: 68 BPM | WEIGHT: 113.32 LBS | OXYGEN SATURATION: 93 %

## 2024-08-20 PROBLEM — E43 SEVERE MALNUTRITION: Status: ACTIVE | Noted: 2024-08-20

## 2024-08-20 LAB
ANION GAP SERPL CALCULATED.3IONS-SCNC: 9.2 MMOL/L (ref 5–15)
BUN SERPL-MCNC: 31 MG/DL (ref 8–23)
BUN/CREAT SERPL: 23.7 (ref 7–25)
CALCIUM SPEC-SCNC: 8.7 MG/DL (ref 8.6–10.5)
CHLORIDE SERPL-SCNC: 111 MMOL/L (ref 98–107)
CO2 SERPL-SCNC: 23.8 MMOL/L (ref 22–29)
CREAT SERPL-MCNC: 1.31 MG/DL (ref 0.57–1)
EGFRCR SERPLBLD CKD-EPI 2021: 40 ML/MIN/1.73
GLUCOSE SERPL-MCNC: 101 MG/DL (ref 65–99)
MAGNESIUM SERPL-MCNC: 2 MG/DL (ref 1.6–2.4)
POTASSIUM SERPL-SCNC: 3.9 MMOL/L (ref 3.5–5.2)
SODIUM SERPL-SCNC: 144 MMOL/L (ref 136–145)

## 2024-08-20 PROCEDURE — 94761 N-INVAS EAR/PLS OXIMETRY MLT: CPT

## 2024-08-20 PROCEDURE — 25010000002 PIPERACILLIN SOD-TAZOBACTAM PER 1 G: Performed by: INTERNAL MEDICINE

## 2024-08-20 PROCEDURE — 71045 X-RAY EXAM CHEST 1 VIEW: CPT

## 2024-08-20 PROCEDURE — 94799 UNLISTED PULMONARY SVC/PX: CPT

## 2024-08-20 PROCEDURE — 99238 HOSP IP/OBS DSCHRG MGMT 30/<: CPT | Performed by: HOSPITALIST

## 2024-08-20 PROCEDURE — 83735 ASSAY OF MAGNESIUM: CPT | Performed by: FAMILY MEDICINE

## 2024-08-20 PROCEDURE — 80048 BASIC METABOLIC PNL TOTAL CA: CPT | Performed by: FAMILY MEDICINE

## 2024-08-20 RX ORDER — SACCHAROMYCES BOULARDII 250 MG
250 CAPSULE ORAL 2 TIMES DAILY
Start: 2024-08-20

## 2024-08-20 RX ORDER — FUROSEMIDE 20 MG
20 TABLET ORAL DAILY PRN
Start: 2024-08-20

## 2024-08-20 RX ADMIN — ALLOPURINOL 200 MG: 100 TABLET ORAL at 09:31

## 2024-08-20 RX ADMIN — FOLIC ACID 1 MG: 1 TABLET ORAL at 09:31

## 2024-08-20 RX ADMIN — GUAIFENESIN 1200 MG: 600 TABLET, EXTENDED RELEASE ORAL at 09:36

## 2024-08-20 RX ADMIN — BUDESONIDE 0.5 MG: 0.5 INHALANT RESPIRATORY (INHALATION) at 09:59

## 2024-08-20 RX ADMIN — PIPERACILLIN SODIUM AND TAZOBACTAM SODIUM 3.38 G: 3; .375 INJECTION, POWDER, LYOPHILIZED, FOR SOLUTION INTRAVENOUS at 09:40

## 2024-08-20 RX ADMIN — AMLODIPINE BESYLATE 5 MG: 5 TABLET ORAL at 09:29

## 2024-08-20 RX ADMIN — CARVEDILOL 6.25 MG: 3.12 TABLET, FILM COATED ORAL at 09:29

## 2024-08-20 RX ADMIN — APIXABAN 2.5 MG: 2.5 TABLET, FILM COATED ORAL at 09:28

## 2024-08-20 RX ADMIN — Medication 10 ML: at 09:42

## 2024-08-20 RX ADMIN — ARFORMOTEROL TARTRATE 15 MCG: 15 SOLUTION RESPIRATORY (INHALATION) at 09:59

## 2024-08-20 RX ADMIN — PANTOPRAZOLE SODIUM 40 MG: 40 TABLET, DELAYED RELEASE ORAL at 09:30

## 2024-08-20 RX ADMIN — FERROUS SULFATE TAB 325 MG (65 MG ELEMENTAL FE) 325 MG: 325 (65 FE) TAB at 09:34

## 2024-08-20 RX ADMIN — PIPERACILLIN SODIUM AND TAZOBACTAM SODIUM 3.38 G: 3; .375 INJECTION, POWDER, LYOPHILIZED, FOR SOLUTION INTRAVENOUS at 02:06

## 2024-08-20 RX ADMIN — ACETAMINOPHEN 650 MG: 325 TABLET ORAL at 09:38

## 2024-08-20 NOTE — NURSING NOTE
Nursing IP/EMS Transfer  Alberta Flores  84 y.o.  female    HPI :   Chief Complaint   Patient presents with    Shortness of Breath     Since this PM, per RN at the [place], pt wears 3L NC at all times and 96% on 3L. Per EMS pt 76% on 4L and placed on NRB 15L and returned to 95%        Admitting doctor:   Jalil Tee DO    Admitting diagnosis:   The primary encounter diagnosis was Acute respiratory failure with hypoxia and hypercapnia. Diagnoses of HAP (hospital-acquired pneumonia), Acute on chronic systolic congestive heart failure, and COPD exacerbation were also pertinent to this visit.    Code status:   Current Code Status       Date Active Code Status Order ID Comments User Context       8/16/2024 0320 No CPR (Do Not Attempt to Resuscitate) [id number]  Jalil Tee DO ED        Question Answer    Code Status (Patient has no pulse and is not breathing) No CPR (Do Not Attempt to Resuscitate)    Medical Interventions (Patient has pulse or is breathing) Full Support    Level Of Support Discussed With Patient                    Allergies:   Iodine, Fluticasone, Sulfa antibiotics, Acetaminophen-codeine, Ceftin [cefuroxime axetil], Codeine, Fluconazole, Fluocinolone, Fluocinolone acetonide, Nuts, Tylenol [acetaminophen], Vioxx [rofecoxib], Atorvastatin, Enoxaparin, and Shellfish allergy    Isolation:   No active isolations    Intake and Output    Intake/Output Summary (Last 24 hours) at 8/20/2024 1322  Last data filed at 8/20/2024 0955  Gross per 24 hour   Intake 340 ml   Output --   Net 340 ml       Weight:       08/18/24  0445   Weight: 51.4 kg (113 lb 5.1 oz)       Most recent vitals:   Vitals:    08/20/24 0633 08/20/24 0757 08/20/24 0959 08/20/24 1138   BP: 151/63 148/69  139/66   BP Location: Left arm Left arm  Left arm   Patient Position: Lying Lying  Lying   Pulse: 68 61 86 68   Resp: 20 18 20 20   Temp: 98 °F (36.7 °C) 97.5 °F (36.4 °C)  97.6 °F (36.4 °C)   TempSrc: Oral Axillary  Oral   SpO2: 100% 100% 92%  93%   Weight:       Height:           Active LDAs/IV Access:   Lines, Drains & Airways       Active LDAs       None                    Labs (abnormal labs have a star):   Lab Results (last 24 hours)       Procedure Component Value Units Date/Time    Basic Metabolic Panel [[id number]]  (Abnormal) Collected: 08/20/24 0422    Specimen: Blood Updated: 08/20/24 0505     Glucose 101 mg/dL      BUN 31 mg/dL      Creatinine 1.31 mg/dL      Sodium 144 mmol/L      Potassium 3.9 mmol/L      Chloride 111 mmol/L      CO2 23.8 mmol/L      Calcium 8.7 mg/dL      BUN/Creatinine Ratio 23.7     Anion Gap 9.2 mmol/L      eGFR 40.0 mL/min/1.73     Narrative:      GFR Normal >60  Chronic Kidney Disease <60  Kidney Failure <15    The GFR formula is only valid for adults with stable renal function between ages 18 and 70.    Magnesium [[id number]]  (Normal) Collected: 08/20/24 0422    Specimen: Blood Updated: 08/20/24 0505     Magnesium 2.0 mg/dL     Blood Culture - Blood, Arm, Right [[id number]]  (Normal) Collected: 08/16/24 0200    Specimen: Blood from Arm, Right Updated: 08/20/24 0215     Blood Culture No growth at 4 days    Blood Culture - Blood, Arm, Left [[id number]]  (Normal) Collected: 08/16/24 0136    Specimen: Blood from Arm, Left Updated: 08/20/24 0145     Blood Culture No growth at 4 days    Magnesium [[id number]]  (Normal) Collected: 08/19/24 1023    Specimen: Blood Updated: 08/19/24 2109     Magnesium 1.8 mg/dL              EKG:   ECG 12 Lead Dyspnea   Final Result   HEART RYUT=020  bpm   RR Niqrelfr=182  ms   MT Mtrmbhym=415  ms   P Horizontal Axis=174  deg   P Front Axis=201  deg   QRSD Zrctljre=834  ms   QT Accwklcr=881  ms   GPrG=058  ms   QRS Axis=-53  deg   T Wave Axis=127  deg   - ABNORMAL ECG -   Sinus or ectopic atrial tachycardia   LVH with secondary repolarization abnormality   ST elevation secondary to IVCD   NO PRIOR TRACING AVAILABLE FOR COMPARISON   Electronically Signed By: Joshua Rahman ([facility]) 2024-08-16 14:28:08    Date and Time of Study:2024-08-16 01:13:44          Current Medications:     Current Facility-Administered Medications:     acetaminophen (TYLENOL) tablet 650 mg, 650 mg, Oral, Q6H PRN, Sen Anders, DO, 650 mg at 08/20/24 0938    allopurinol (ZYLOPRIM) tablet 200 mg, 200 mg, Oral, Daily, [place], 200 mg at 08/20/24 0931    ALPRAZolam (XANAX) tablet 0.25 mg, 0.25 mg, Oral, BID PRN, Sen Anders, DO, 0.25 mg at 08/19/24 2252    amLODIPine (NORVASC) tablet 5 mg, 5 mg, Oral, Daily, Sen Anders, DO, 5 mg at 08/20/24 0929    apixaban (ELIQUIS) tablet 2.5 mg, 2.5 mg, Oral, BID, Sen Anders, DO, 2.5 mg at 08/20/24 0928    arformoterol (BROVANA) nebulizer solution 15 mcg, 15 mcg, Nebulization, BID - RT, Sen Anders, DO, 15 mcg at 08/20/24 0959    sennosides-docusate (PERICOLACE) 8.6-50 MG per tablet 2 tablet, 2 tablet, Oral, BID, 2 tablet at 08/19/24 2253 **AND** polyethylene glycol (MIRALAX) packet 17 g, 17 g, Oral, Daily PRN **AND** bisacodyl (DULCOLAX) EC tablet 5 mg, 5 mg, Oral, Daily PRN **AND** bisacodyl (DULCOLAX) suppository 10 mg, 10 mg, Rectal, Daily PRN, Sen Anders, DO    budesonide (PULMICORT) nebulizer solution 0.5 mg, 0.5 mg, Nebulization, BID, Sen Anders, DO, 0.5 mg at 08/20/24 0959    Calcium Replacement - Follow Nurse / BPA Driven Protocol, , Does not apply, PRN, Kaelyn Zheng R, APRN    carvedilol (COREG) tablet 6.25 mg, 6.25 mg, Oral, BID With Meals, Sen Anders, DO, 6.25 mg at 08/20/24 0929    docusate sodium (COLACE) capsule 100 mg, 100 mg, Oral, Nightly, Sen Anders DO, 100 mg at 08/19/24 2253    ferrous sulfate tablet 325 mg, 325 mg, Oral, Daily With Breakfast, Sen Anders DO, 325 mg at 08/20/24 0934    folic acid (FOLVITE) tablet 1 mg, 1 mg, Oral, Daily, Sen Anders DO, 1 mg at 08/20/24 0931    furosemide (LASIX) tablet 20 mg, 20 mg, Oral, Daily PRN, Sen Anders DO    guaiFENesin (MUCINEX) 12 hr tablet 1,200 mg, 1,200 mg, Oral, BID, Sen Anders DO, 1,200  mg at 08/20/24 0936    ipratropium-albuterol (DUO-NEB) nebulizer solution 3 mL, 3 mL, Nebulization, Q4H PRN, Sen Anders, , 3 mL at 08/18/24 1755    labetalol (NORMODYNE,TRANDATE) injection 20 mg, 20 mg, Intravenous, Q6H PRN, Jalil Tee DO    Magnesium Low Dose Replacement - Follow Nurse / BPA Driven Protocol, , Does not apply, PRN, Kaelyn Zheng APRN    ondansetron ODT (ZOFRAN-ODT) disintegrating tablet 4 mg, 4 mg, Oral, Q6H PRN **OR** ondansetron (ZOFRAN) injection 4 mg, 4 mg, Intravenous, Q6H PRN, Sen Anders DO    pantoprazole (PROTONIX) EC tablet 40 mg, 40 mg, Oral, Daily, Sen Anders DO, 40 mg at 08/20/24 0930    Phosphorus Replacement - Follow Nurse / BPA Driven Protocol, , Does not apply, PRN, aKelyn Zheng APRN    piperacillin-tazobactam (ZOSYN) IVPB 3.375 g IVPB in 100 mL NS (VTB), 3.375 g, Intravenous, Q8H, Sen Anders DO, 3.375 g at 08/20/24 0940    Potassium Replacement - Follow Nurse / BPA Driven Protocol, , Does not apply, PRN, Kaelyn Zheng APRN    sodium chloride 0.9 % flush 10 mL, 10 mL, Intravenous, Q12H, Sen Anders DO, 10 mL at 08/20/24 0942    sodium chloride 0.9 % flush 10 mL, 10 mL, Intravenous, PRN, Sen Anders DO    sodium chloride 0.9 % infusion 40 mL, 40 mL, Intravenous, PRN, Sen Anders DO, 40 mL at 08/16/24 1359     Imaging results:  Imaging Results (Last 72 Hours)       Procedure Component Value Units Date/Time    XR Chest 1 View [[id number]] Collected: 08/20/24 0832     Updated: 08/20/24 0837    Narrative:      XR CHEST 1 VW    Date of Exam: 8/20/2024 6:25 AM EDT    Indication: reevaluate infiltrates/bilateral effusions    Comparison: 8/18/2024 2027 hours, 8/16/2024    Findings:  Bibasilar infiltrates are again noted. Bilateral pleural effusions are seen. Diffuse interstitial changes are again noted. The findings are stable given differences in patient positioning and technique. The cardiac silhouette and mediastinum are stable.   No acute  osseous abnormalities are observed.      Impression:      Impression:  Bibasilar infiltrates are again noted with bilateral pleural effusions. These findings are stable. Diffuse interstitial changes are again seen throughout the lungs which are stable.      Electronically Signed: Haseeb Olsen MD    8/20/2024 8:34 AM EDT    Workstation ID: [id number]    XR Chest 1 View [[id number]] Collected: 08/18/24 2250     Updated: 08/18/24 2254    Narrative:      XR CHEST 1 VW    Date of Exam: 8/18/2024 10:21 PM EDT    Indication: short of breath.    Comparison: 8/16/2024.    Findings:  There is improved interstitial disease in the lungs. There is increased right basilar airspace disease. There are increased small to moderate bilateral pleural effusions. Exam limited due to patient positioning and obliquity. No pneumothorax. Cardiac   silhouette is not well seen. Pulmonary vasculature is indistinct. No acute osseous abnormality. Chronic deformity at the right shoulder.      Impression:      Impression:  Improved interstitial disease with increased right basilar airspace disease and increased small to moderate bilateral pleural effusions.        Electronically Signed: Sandip Nieto MD    8/18/2024 10:51 PM EDT    Workstation ID: [id number]             Ambulatory status:   - non ambulatory at this time.     Social issues:   Social History     Socioeconomic History    Marital status: Single   Tobacco Use    Smoking status: Never   Vaping Use    Vaping status: Never Used   Substance and Sexual Activity    Alcohol use: No    Drug use: Never    Sexual activity: Defer       NIH Stroke Scale:         Angelina Victor RN  08/20/24 13:22 EDT

## 2024-08-20 NOTE — DISCHARGE SUMMARY
"Alberta Flores  1940  [id number]    Hospitalists Discharge Summary    Date of Admission: 8/16/2024  Date of Discharge:  8/20/2024    History of Present Illness from [facility] on admit:   \"85 y/o female with hx of COPD, HTN, GERD, and HFpEF, presents to [facility] [place] after being found unresponsive.  Per EMS, she wears 3 L oxygen at baseline. She was recently admitted to [facility] per chart review for acute COPD exacerbation.  In the ED, she was noted to be hypoxic and hypercarbic.  She was placed on BIPAP and given IV steroids, duoneb x 2, and IV lasix.  She is now more alert on oxygen.  She denies any chest or abdominal pain or nausea.  She is too confused to answer any further questions at this time.\"        Primary Discharge diagnoses:  Acute on chronic hypoxic hypercapnic respiratory failure secondary to suspected aspiration pneumonia and AE COPD  Acute on chronic HFpEF  Elevated troponin secondary to hypoxemia  Atrial fibrillation with RVR on chronic atrial fibrillation   Severe malnutrition    Secondary Discharge Diagnoses:   Anemia of chronic disease  Aortic stenosis  CKD 3b  Hypertension  CAD    Hospital Course Summary:   The patient was initially treated with Zosyn for aspiration pneumonia and Lasix for heart failure.  Lasix was stopped when creatinine chris slightly and patient was felt to be euvolemic.  Cardiology followed and did not feel further diuresis was warranted and recommended Lasix dosing when needed.  No further cardiac testing was recommended.  Patient frequently refused medications, lab draws, physical therapy.  Case management followed and obtained pre-CERT for patient to return to [facility] unit for long-term care.  Patient remains DNR/DNI.  Sepsis was ruled out because the patient was not septic.  Benzo therapy may not be a good choice for this patient unless pursuing comfort care.  Avoid Hyperoxia.  Return to facility today on augmentin with " probiotic for 5 more days  Severe malnutrition was identified by dietitian with boost plus added daily at lunch and dinner  Follow-up PCP 1 week  Follow-up cardiology 2 weeks or as needed    PCP  Patient Care Team:  Karsten Caldera MD as PCP - General (Family Medicine)    Consults:   Consults       Date and Time Order Name Status Description    8/18/2024 10:14 PM Inpatient Cardiology Consult Completed           Operations and Procedures Performed:     XR Chest 1 View    Result Date: 8/18/2024  Narrative: XR CHEST 1 VW Date of Exam: 8/18/2024 10:21 PM EDT Indication: short of breath. Comparison: 8/16/2024. Findings: There is improved interstitial disease in the lungs. There is increased right basilar airspace disease. There are increased small to moderate bilateral pleural effusions. Exam limited due to patient positioning and obliquity. No pneumothorax. Cardiac silhouette is not well seen. Pulmonary vasculature is indistinct. No acute osseous abnormality. Chronic deformity at the right shoulder.     Impression: Impression: Improved interstitial disease with increased right basilar airspace disease and increased small to moderate bilateral pleural effusions. Electronically Signed: Sandip Nieto MD  8/18/2024 10:51 PM EDT  Workstation ID: [id number]    NM Lung Scan Perfusion Particulate    Result Date: 8/16/2024  Narrative: NM LUNG SCAN PERFUSION PARTICULATE Date of Exam: 8/16/2024 5:00 AM EDT Indication: short of air, elevated d-dimer. Comparison: None available. Technique: 5.1 mCi of technetium 99m labeled MAA was administered intravenously for the perfusion portion of the pulmonary exam.  Scintigraphic images of the lungs were obtained in multiple projections to assess perfusion. Findings: Ventilation study was attempted but unable to get adequate imaging due to patient's condition. The perfusion exam is suboptimal. There is very heterogeneous perfusion with the lung structures very difficult to assess with low  overall counts, felt to be nondiagnostic for evaluation of perfusion defects. It is unlikely that a perfusion/ventilation scan on this patient will be diagnostic for pulmonary embolism evaluation. Consider evaluation of the lower extremity with duplex Doppler and if possible CT pulmonary embolism protocol.     Impression: Impression: Nondiagnostic perfusion scan. Unable to perform ventilation scan. Please see the above discussion. Electronically Signed: Silvestre Thompson MD  2024 6:22 AM EDT  Workstation ID: [id number]    XR Chest 1 View    Result Date: 2024  Narrative: XR CHEST 1 VW Date of Exam: 2024 1:15 AM EDT Indication: Short of air Comparison: None available. Findings: The heart is enlarged. There are coarse interstitial and alveolar densities seen throughout both lungs slightly greater on the right than the left and greatest in the right upper lobe and lateral basilar right lower lobe. These findings are most consistent with multifocal pneumonia though a background of chronic interstitial disease is also probably present. There is emphysema. The osseous structures are normal.     Impression: Impression: Multifocal pneumonia. Electronically Signed: Silvestre Thompson MD  2024 1:27 AM EDT  Workstation ID: [id number]    Duplex US Venous Ext Low Bilateral    Result Date: 2024  Narrative: REVIEWING YOUR TEST RESULTS IN [facility] IS NOT A SUBSTITUTE FOR DISCUSSING THOSE RESULTS WITH YOUR HEALTH CARE PROVIDER. PLEASE CONTACT YOUR PROVIDER VIA [employer] TO DISCUSS ANY QUESTIONS OR CONCERNS YOU MAY HAVE REGARDING THESE TEST RESULTS.  RADIOLOGY REPORT FACILITY: [facility] AGE/GENDER:  AGE: 84 Y            GENDER: F PATIENT NAME/: KAMILA STEPHENS A    : 1940 UNIT NUMBER: [id number] ACCESSION NUMBER: [id number] ACCOUNT: PROCEDURE PERFORMED: DUPLEX US VENOUS EXT LOW BILATERAL Conclusions: Bilateral lower extremities without evidence of deep or superficial vein thrombus.  Compared to previous examination 2024, there has been no significant change. THIS DOCUMENT HAS BEEN ELECTRONICALLY SIGNED BY: Darnell Pool MD    XR Chest 1 View    Result Date: 8/3/2024  Narrative: REVIEWING YOUR TEST RESULTS IN [facility] IS NOT A SUBSTITUTE FOR DISCUSSING THOSE RESULTS WITH YOUR HEALTH CARE PROVIDER. PLEASE CONTACT YOUR PROVIDER [facility]IA [facility] TO DISCUSS ANY QUESTIONS OR CONCERNS YOU MAY HAVE REGARDING THESE TEST RESULTS.  RADIOLOGY REPORT FACILITY:  [facility] UNIT/AGE/GENDER: J.ED  ER      AGE:84 Y          SEX:F PATIENT NAME/:  KAMILA STEPHENS A    1940 UNIT NUMBER:  [id number] ACCOUNT NUMBER:  [id number] ACCESSION NUMBER:  [id number] Chest Single View Examination dated 2024 CLINICAL HISTORY: SOB. COMPARISON: 2024 AP view of the chest is provided.   The patient is slightly rotated to the left. There is trace blunting of the left costophrenic angle. No right pleural effusion. Mild diffuse interstitial prominence, similar to prior, likely chronic change. No focal airspace consolidation. No pneumothorax. Stable cardiomediastinal silhouette. No acute osseous findings. IMPRESSION: There is slight blunting of the left costophrenic angle which could represent a trace effusion, decreased in size from comparison. No acute chest findings otherwise. Dictated by: Mary Lou Brown M.D. Images and Report reviewed and interpreted by: Mary Lou Brown M.D. <PS><Electronically signed by: Mary Lou Brown M.D.> 2024 0125 D: 2024 T: 2024     Allergies:  is allergic to iodine, fluticasone, sulfa antibiotics, acetaminophen-codeine, ceftin [cefuroxime axetil], codeine, fluconazole, fluocinolone, fluocinolone acetonide, nuts, tylenol [acetaminophen], vioxx [rofecoxib], atorvastatin, enoxaparin, and shellfish allergy.    Marlo  Reviewed    Discharge Medications:     Discharge Medications        New Medications        Instructions Start  Date   amoxicillin-clavulanate 875-125 MG per tablet  Commonly known as: AUGMENTIN   1 tablet, Oral, 2 Times Daily      saccharomyces boulardii 250 MG capsule  Commonly known as: Florastor   250 mg, Oral, 2 Times Daily             Changes to Medications        Instructions Start Date   furosemide 20 MG tablet  Commonly known as: LASIX  What changed:   when to take this  reasons to take this   20 mg, Oral, Daily PRN             Continue These Medications        Instructions Start Date   acetaminophen 500 MG tablet  Commonly known as: TYLENOL   1,000 mg, Oral, 3 Times Daily PRN      allopurinol 100 MG tablet  Commonly known as: ZYLOPRIM   200 mg, Oral, Daily      ALPRAZolam 0.25 MG tablet  Commonly known as: XANAX   0.25 mg, Oral, 2 Times Daily PRN      apixaban 2.5 MG tablet tablet  Commonly known as: ELIQUIS   2.5 mg, Oral, 2 Times Daily      budesonide 0.5 MG/2ML nebulizer solution  Commonly known as: PULMICORT   0.5 mg, Nebulization, 2 Times Daily      carvedilol 6.25 MG tablet  Commonly known as: COREG   6.25 mg, Oral, 2 Times Daily With Meals      docusate sodium 100 MG capsule  Commonly known as: COLACE   100 mg, Oral, Nightly, Hold for greater than 2 BM a day      ferrous sulfate 325 (65 FE) MG tablet   325 mg, Oral, Daily With Breakfast      folic acid 1 MG tablet  Commonly known as: FOLVITE   1 mg, Oral, Daily      formoterol 20 MCG/2ML nebulizer solution  Commonly known as: PERFOROMIST   20 mcg, Nebulization, 2 Times Daily - RT      GUAIFENESIN ER PO   1,200 mg, Oral, 2 Times Daily      ipratropium-albuterol 0.5-2.5 mg/3 ml nebulizer  Commonly known as: DUO-NEB   3 mL, Nebulization, Every 4 Hours PRN      NORVASC PO   5 mg, Oral, Daily      pantoprazole 40 MG EC tablet  Commonly known as: PROTONIX   40 mg, Oral, Daily      vitamin B-12 100 MCG tablet  Commonly known as: CYANOCOBALAMIN   50 mcg, Oral, Daily             Stop These Medications      baclofen 10 MG tablet  Commonly known as: LIORESAL     liver  oil-zinc oxide 40 % ointment  Commonly known as: DESITIN     loperamide 2 MG capsule  Commonly known as: IMODIUM              Last Lab Results:   Lab Results (most recent)       Procedure Component Value Units Date/Time    Magnesium [[id number]]  (Normal) Collected: 08/19/24 1023    Specimen: Blood Updated: 08/19/24 2109     Magnesium 1.8 mg/dL     Basic Metabolic Panel [[id number]]  (Abnormal) Collected: 08/19/24 1023    Specimen: Blood Updated: 08/19/24 1047     Glucose 191 mg/dL      BUN 32 mg/dL      Creatinine 1.42 mg/dL      Sodium 142 mmol/L      Potassium 3.1 mmol/L      Chloride 108 mmol/L      CO2 22.4 mmol/L      Calcium 8.5 mg/dL      BUN/Creatinine Ratio 22.5     Anion Gap 11.6 mmol/L      eGFR 36.3 mL/min/1.73     Narrative:      GFR Normal >60  Chronic Kidney Disease <60  Kidney Failure <15    The GFR formula is only valid for adults with stable renal function between ages 18 and 70.    CBC & Differential [[id number]]  (Abnormal) Collected: 08/19/24 1023    Specimen: Blood Updated: 08/19/24 1035    Narrative:      The following orders were created for panel order CBC & Differential.  Procedure                               Abnormality         Status                     ---------                               -----------         ------                     CBC Auto Differential[[id number]]        Abnormal            Final result                 Please view results for these tests on the individual orders.    CBC Auto Differential [[id number]]  (Abnormal) Collected: 08/19/24 1023    Specimen: Blood Updated: 08/19/24 1035     WBC 6.56 10*3/mm3      RBC 2.66 10*6/mm3      Hemoglobin 8.2 g/dL      Hematocrit 26.4 %      MCV 99.2 fL      MCH 30.8 pg      MCHC 31.1 g/dL      RDW 17.6 %      RDW-SD 63.9 fl      MPV 9.3 fL      Platelets 151 10*3/mm3      Neutrophil % 75.9 %      Lymphocyte % 9.9 %      Monocyte % 10.7 %      Eosinophil % 3.0 %      Basophil % 0.2 %      Immature Grans % 0.3 %      Neutrophils,  Absolute 4.98 10*3/mm3      Lymphocytes, Absolute 0.65 10*3/mm3      Monocytes, Absolute 0.70 10*3/mm3      Eosinophils, Absolute 0.20 10*3/mm3      Basophils, Absolute 0.01 10*3/mm3      Immature Grans, Absolute 0.02 10*3/mm3     Blood Culture - Blood, Arm, Right [[id number]]  (Normal) Collected: 08/16/24 0200    Specimen: Blood from Arm, Right Updated: 08/19/24 0215     Blood Culture No growth at 3 days    Blood Culture - Blood, Arm, Left [[id number]]  (Normal) Collected: 08/16/24 0136    Specimen: Blood from Arm, Left Updated: 08/19/24 0145     Blood Culture No growth at 3 days    Blood Gas, Arterial - [[id number]]  (Abnormal) Collected: 08/18/24 2150    Specimen: Arterial Blood Updated: 08/18/24 2158     Site Left Brachial     Kt's Test N/A     pH, Arterial 7.274 pH units      Comment: 84 Value below reference range        pCO2, Arterial 52.5 mm Hg      Comment: 83 Value above reference range        pO2, Arterial 61.5 mm Hg      Comment: 84 Value below reference range        HCO3, Arterial 24.3 mmol/L      Base Excess, Arterial -2.8 mmol/L      Comment: 84 Value below reference range        O2 Saturation, Arterial 89.2 %      Comment: 84 Value below reference range        Hemoglobin, Blood Gas 10.3 g/dL      Comment: 84 Value below reference range        Temperature 37.0     Barometric Pressure for Blood Gas 739 mmHg      Modality Nasal Cannula     Flow Rate 3.0 lpm      Collected by [id number]     Comment: Meter: [id number]     :  [id number]        pCO2, Temperature Corrected 52.5 mm Hg      pH, Temp Corrected 7.274 pH Units      pO2, Temperature Corrected 61.5 mm Hg     Procalcitonin [[id number]]  (Abnormal) Collected: 08/18/24 0110    Specimen: Blood Updated: 08/18/24 0138     Procalcitonin 14.90 ng/mL     Narrative:      As a Marker for Sepsis (Non-Neonates):    1. <0.5 ng/mL represents a low risk of severe sepsis and/or septic shock.  2. >2 ng/mL represents a high risk of severe sepsis and/or septic  "shock.    As a Marker for Lower Respiratory Tract Infections that require antibiotic therapy:    PCT on Admission    Antibiotic Therapy       6-12 Hrs later    >0.5                Strongly Recommended  >0.25 - <0.5        Recommended   0.1 - 0.25          Discouraged              Remeasure/reassess PCT  <0.1                Strongly Discouraged     Remeasure/reassess PCT    As 28 day mortality risk marker: \"Change in Procalcitonin Result\" (>80% or <=80%) if Day 0 (or Day 1) and Day 4 values are available. Refer to http://www.[facility]-pct-calculator.com    Change in PCT <=80%  A decrease of PCT levels below or equal to 80% defines a positive change in PCT test result representing a higher risk for 28-day all-cause mortality of patients diagnosed with severe sepsis for septic shock.    Change in PCT >80%  A decrease of PCT levels of more than 80% defines a negative change in PCT result representing a lower risk for 28-day all-cause mortality of patients diagnosed with severe sepsis or septic shock.       Vancomycin, Random [[id number]]  (Normal) Collected: 08/18/24 0110    Specimen: Blood Updated: 08/18/24 0137     Vancomycin Random 7.08 mcg/mL     Narrative:      Therapeutic Ranges for Vancomycin    Vancomycin Random   5.0-40.0 mcg/mL  Vancomycin Trough   5.0-20.0 mcg/mL  Vancomycin Peak     20.0-40.0 mcg/mL    Basic Metabolic Panel [[id number]]  (Abnormal) Collected: 08/18/24 0110    Specimen: Blood Updated: 08/18/24 0137     Glucose 102 mg/dL      BUN 41 mg/dL      Creatinine 1.72 mg/dL      Sodium 140 mmol/L      Potassium 3.7 mmol/L      Chloride 108 mmol/L      CO2 22.0 mmol/L      Calcium 8.7 mg/dL      BUN/Creatinine Ratio 23.8     Anion Gap 10.0 mmol/L      eGFR 28.9 mL/min/1.73     Narrative:      GFR Normal >60  Chronic Kidney Disease <60  Kidney Failure <15    The GFR formula is only valid for adults with stable renal function between ages 18 and 70.    CBC & Differential [[id number]]  (Abnormal) Collected: " 08/18/24 0110    Specimen: Blood Updated: 08/18/24 0114    Narrative:      The following orders were created for panel order CBC & Differential.  Procedure                               Abnormality         Status                     ---------                               -----------         ------                     CBC Auto Differential[[id number]]        Abnormal            Final result                 Please view results for these tests on the individual orders.    CBC Auto Differential [[id number]]  (Abnormal) Collected: 08/18/24 0110    Specimen: Blood Updated: 08/18/24 0114     WBC 7.48 10*3/mm3      RBC 2.72 10*6/mm3      Hemoglobin 8.3 g/dL      Hematocrit 26.5 %      MCV 97.4 fL      MCH 30.5 pg      MCHC 31.3 g/dL      RDW 17.8 %      RDW-SD 63.8 fl      MPV 9.0 fL      Platelets 164 10*3/mm3      Neutrophil % 70.0 %      Lymphocyte % 14.4 %      Monocyte % 10.0 %      Eosinophil % 4.7 %      Basophil % 0.5 %      Immature Grans % 0.4 %      Neutrophils, Absolute 5.23 10*3/mm3      Lymphocytes, Absolute 1.08 10*3/mm3      Monocytes, Absolute 0.75 10*3/mm3      Eosinophils, Absolute 0.35 10*3/mm3      Basophils, Absolute 0.04 10*3/mm3      Immature Grans, Absolute 0.03 10*3/mm3      nRBC 0.0 /100 WBC     Procalcitonin [[id number]]  (Abnormal) Collected: 08/17/24 0626    Specimen: Blood Updated: 08/17/24 0727     Procalcitonin 16.90 ng/mL     Narrative:      As a Marker for Sepsis (Non-Neonates):    1. <0.5 ng/mL represents a low risk of severe sepsis and/or septic shock.  2. >2 ng/mL represents a high risk of severe sepsis and/or septic shock.    As a Marker for Lower Respiratory Tract Infections that require antibiotic therapy:    PCT on Admission    Antibiotic Therapy       6-12 Hrs later    >0.5                Strongly Recommended  >0.25 - <0.5        Recommended   0.1 - 0.25          Discouraged              Remeasure/reassess PCT  <0.1                Strongly Discouraged     Remeasure/reassess PCT    As  "28 day mortality risk marker: \"Change in Procalcitonin Result\" (>80% or <=80%) if Day 0 (or Day 1) and Day 4 values are available. Refer to http://www.[facility]-pct-calculator.com    Change in PCT <=80%  A decrease of PCT levels below or equal to 80% defines a positive change in PCT test result representing a higher risk for 28-day all-cause mortality of patients diagnosed with severe sepsis for septic shock.    Change in PCT >80%  A decrease of PCT levels of more than 80% defines a negative change in PCT result representing a lower risk for 28-day all-cause mortality of patients diagnosed with severe sepsis or septic shock.       Respiratory Panel PCR w/COVID-19(SARS-CoV-2) GAYATRI/CLAUDE/FIOR/PAD/COR/SANIA In-House, NP Swab in UTM/VTM, 2 HR TAT - Swab, Nasopharynx [[id number]]  (Normal) Collected: 08/16/24 0838    Specimen: Swab from Nasopharynx Updated: 08/16/24 1126     ADENOVIRUS, PCR Not Detected     Coronavirus 229E Not Detected     Coronavirus HKU1 Not Detected     Coronavirus NL63 Not Detected     Coronavirus OC43 Not Detected     COVID19 Not Detected     Human Metapneumovirus Not Detected     Human Rhinovirus/Enterovirus Not Detected     Influenza A PCR Not Detected     Influenza B PCR Not Detected     Parainfluenza Virus 1 Not Detected     Parainfluenza Virus 2 Not Detected     Parainfluenza Virus 3 Not Detected     Parainfluenza Virus 4 Not Detected     RSV, PCR Not Detected     Bordetella pertussis pcr Not Detected     Bordetella parapertussis PCR Not Detected     Chlamydophila pneumoniae PCR Not Detected     Mycoplasma pneumo by PCR Not Detected    Narrative:      In the setting of a positive respiratory panel with a viral infection PLUS a negative procalcitonin without other underlying concern for bacterial infection, consider observing off antibiotics or discontinuation of antibiotics and continue supportive care. If the respiratory panel is positive for atypical bacterial infection (Bordetella pertussis, " Chlamydophila pneumoniae, or Mycoplasma pneumoniae), consider antibiotic de-escalation to target atypical bacterial infection.    Blood Gas, Arterial - [[id number]]  (Abnormal) Collected: 08/16/24 0820    Specimen: Arterial Blood Updated: 08/16/24 0829     Site Left Radial     Kt's Test Positive     pH, Arterial 7.385 pH units      pCO2, Arterial 39.4 mm Hg      pO2, Arterial 122.0 mm Hg      Comment: 83 Value above reference range        HCO3, Arterial 23.6 mmol/L      Base Excess, Arterial -1.4 mmol/L      Comment: 84 Value below reference range        O2 Saturation, Arterial 99.5 %      Comment: 83 Value above reference range        Hemoglobin, Blood Gas 9.7 g/dL      Comment: 84 Value below reference range        Temperature 37.0     Barometric Pressure for Blood Gas 740 mmHg      Modality NIV     FIO2 40 %      Ventilator Mode BIPAP-ST     Set Mech Resp Rate 14.0     IPAP 12     Comment: Meter: [id number]     :  [id number]        EPAP 6     Collected by [id number]     pCO2, Temperature Corrected 39.4 mm Hg      pH, Temp Corrected 7.385 pH Units      pO2, Temperature Corrected 122 mm Hg      PO2/FIO2 305    High Sensitivity Troponin T 2Hr [[id number]]  (Abnormal) Collected: 08/16/24 0350    Specimen: Blood from Arm, Left Updated: 08/16/24 0424     HS Troponin T 120 ng/L      Troponin T Delta 52 ng/L     Narrative:      High Sensitive Troponin T Reference Range:  <14.0 ng/L- Negative Female for AMI  <22.0 ng/L- Negative Male for AMI  >=14 - Abnormal Female indicating possible myocardial injury.  >=22 - Abnormal Male indicating possible myocardial injury.   Clinicians would have to utilize clinical acumen, EKG, Troponin, and serial changes to determine if it is an Acute Myocardial Infarction or myocardial injury due to an underlying chronic condition.         Urinalysis, Microscopic Only - Urine, Clean Catch [[id number]]  (Abnormal) Collected: 08/16/24 0159    Specimen: Urine, Clean Catch Updated: 08/16/24  0232     RBC, UA 3-5 /HPF      WBC, UA 0-2 /HPF      Bacteria, UA Trace /HPF      Squamous Epithelial Cells, UA 3-6 /HPF      Hyaline Casts, UA 7-12 /LPF      Fine Granular Casts, UA 0-2 /LPF      Methodology Manual Light Microscopy    COVID-19 and FLU A/B PCR, 1 HR TAT - Swab, Nasopharynx [[id number]]  (Normal) Collected: 08/16/24 0200    Specimen: Swab from Nasopharynx Updated: 08/16/24 0227     COVID19 Not Detected     Influenza A PCR Not Detected     Influenza B PCR Not Detected    Narrative:      Fact sheet for providers: https://www.fda.gov/media/084990/download    Fact sheet for patients: https://www.fda.gov/media/712862/download    Test performed by PCR.    Urinalysis With Microscopic If Indicated (No Culture) - Urine, Clean Catch [[id number]]  (Abnormal) Collected: 08/16/24 0159    Specimen: Urine, Clean Catch Updated: 08/16/24 0220     Color, UA Yellow     Appearance, UA Clear     pH, UA 5.5     Specific Gravity, UA 1.019     Comment: Result obtained by Refractometer        Glucose, UA Negative     Ketones, UA Negative     Bilirubin, UA Negative     Blood, UA Moderate (2+)     Protein, UA >=300 mg/dL (3+)     Leuk Esterase, UA Negative     Nitrite, UA Negative     Urobilinogen, UA 0.2 E.U./dL    Single High Sensitivity Troponin T [[id number]]  (Abnormal) Collected: 08/16/24 0136    Specimen: Blood Updated: 08/16/24 0220     HS Troponin T 68 ng/L     Narrative:      High Sensitive Troponin T Reference Range:  <14.0 ng/L- Negative Female for AMI  <22.0 ng/L- Negative Male for AMI  >=14 - Abnormal Female indicating possible myocardial injury.  >=22 - Abnormal Male indicating possible myocardial injury.   Clinicians would have to utilize clinical acumen, EKG, Troponin, and serial changes to determine if it is an Acute Myocardial Infarction or myocardial injury due to an underlying chronic condition.         Comprehensive Metabolic Panel [[id number]]  (Abnormal) Collected: 08/16/24 0136    Specimen: Blood Updated:  08/16/24 0208     Glucose 193 mg/dL      BUN 27 mg/dL      Creatinine 1.24 mg/dL      Sodium 139 mmol/L      Potassium 4.3 mmol/L      Chloride 107 mmol/L      CO2 23.3 mmol/L      Calcium 9.0 mg/dL      Total Protein 6.5 g/dL      Albumin 3.5 g/dL      ALT (SGPT) 18 U/L      AST (SGOT) 21 U/L      Alkaline Phosphatase 180 U/L      Total Bilirubin 0.3 mg/dL      Globulin 3.0 gm/dL      A/G Ratio 1.2 g/dL      BUN/Creatinine Ratio 21.8     Anion Gap 8.7 mmol/L      eGFR 42.7 mL/min/1.73     Narrative:      GFR Normal >60  Chronic Kidney Disease <60  Kidney Failure <15    The GFR formula is only valid for adults with stable renal function between ages 18 and 70.    BNP [[id number]]  (Abnormal) Collected: 08/16/24 0136    Specimen: Blood Updated: 08/16/24 0208     proBNP 2,624.0 pg/mL     Narrative:      This assay is used as an aid in the diagnosis of individuals suspected of having heart failure. It can be used as an aid in the diagnosis of acute decompensated heart failure (ADHF) in patients presenting with signs and symptoms of ADHF to the emergency department (ED). In addition, NT-proBNP of <300 pg/mL indicates ADHF is not likely.    Age Range Result Interpretation  NT-proBNP Concentration (pg/mL:      <50             Positive            >450                   Gray                 300-450                    Negative             <300    50-75           Positive            >900                  Gray                300-900                  Negative            <300      >75             Positive            >1800                  Gray                300-1800                  Negative            <300    Lactic Acid, Plasma [[id number]]  (Normal) Collected: 08/16/24 0136    Specimen: Blood Updated: 08/16/24 0200     Lactate 0.7 mmol/L     D-dimer, Quantitative [[id number]]  (Abnormal) Collected: 08/16/24 0136    Specimen: Blood Updated: 08/16/24 0158     D-Dimer, Quantitative 1.83 MCGFEU/mL     Narrative:      According to  "the assay 's published package insert, a normal (<0.50 MCGFEU/mL) D-dimer result in conjunction with a non-high clinical probability assessment, excludes deep vein thrombosis (DVT) and pulmonary embolism (PE) with high sensitivity.    D-dimer values increase with age and this can make VTE exclusion of an older population difficult. To address this, the [employer], based on best available evidence and recent guidelines, recommends that clinicians use age-adjusted D-dimer thresholds in patients greater than 50 years of age with: a) a low probability of PE who do not meet all Pulmonary Embolism Rule Out Criteria, or b) in those with intermediate probability of PE.   The formula for an age-adjusted D-dimer cut-off is \"age/100\".  For example, a 60 year old patient would have an age-adjusted cut-off of 0.60 MCGFEU/mL and an 80 year old 0.80 MCGFEU/mL.    Protime-INR [[id number]]  (Abnormal) Collected: 08/16/24 0136    Specimen: Blood Updated: 08/16/24 0158     Protime 15.4 Seconds      INR 1.17    Narrative:      Therapeutic Ranges for INR: 2.0-3.0 (PT 20-30)                              2.5-3.5 (PT 25-34)          Imaging Results (Most Recent)       Procedure Component Value Units Date/Time    XR Chest 1 View [[id number]] Collected: 08/18/24 2250     Updated: 08/18/24 2254    Narrative:      XR CHEST 1 VW    Date of Exam: 8/18/2024 10:21 PM EDT    Indication: short of breath.    Comparison: 8/16/2024.    Findings:  There is improved interstitial disease in the lungs. There is increased right basilar airspace disease. There are increased small to moderate bilateral pleural effusions. Exam limited due to patient positioning and obliquity. No pneumothorax. Cardiac   silhouette is not well seen. Pulmonary vasculature is indistinct. No acute osseous abnormality. Chronic deformity at the right shoulder.      Impression:      Impression:  Improved interstitial disease with increased right basilar " airspace disease and increased small to moderate bilateral pleural effusions.        Electronically Signed: Sandip Nieto MD    8/18/2024 10:51 PM EDT    Workstation ID: [id number]    NM Lung Scan Perfusion Particulate [[id number]] Collected: 08/16/24 0619     Updated: 08/16/24 0625    Narrative:      NM LUNG SCAN PERFUSION PARTICULATE    Date of Exam: 8/16/2024 5:00 AM EDT    Indication: short of air, elevated d-dimer.    Comparison: None available.    Technique: 5.1 mCi of technetium 99m labeled MAA was administered intravenously for the perfusion portion of the pulmonary exam.  Scintigraphic images of the lungs were obtained in multiple projections to assess perfusion.      Findings:  Ventilation study was attempted but unable to get adequate imaging due to patient's condition. The perfusion exam is suboptimal. There is very heterogeneous perfusion with the lung structures very difficult to assess with low overall counts, felt to be   nondiagnostic for evaluation of perfusion defects. It is unlikely that a perfusion/ventilation scan on this patient will be diagnostic for pulmonary embolism evaluation. Consider evaluation of the lower extremity with duplex Doppler and if possible CT   pulmonary embolism protocol.      Impression:      Impression:  Nondiagnostic perfusion scan. Unable to perform ventilation scan. Please see the above discussion.        Electronically Signed: Silvestre Thompson MD    8/16/2024 6:22 AM EDT    Workstation ID: [id number]    XR Chest 1 View [[id number]] Collected: 08/16/24 0126     Updated: 08/16/24 0135    Narrative:      XR CHEST 1 VW    Date of Exam: 8/16/2024 1:15 AM EDT    Indication: Short of air    Comparison: None available.    Findings:  The heart is enlarged. There are coarse interstitial and alveolar densities seen throughout both lungs slightly greater on the right than the left and greatest in the right upper lobe and lateral basilar right lower lobe. These findings are most    consistent with multifocal pneumonia though a background of chronic interstitial disease is also probably present. There is emphysema. The osseous structures are normal.      Impression:      Impression:  Multifocal pneumonia.        Electronically Signed: Silvestre Thompson MD    8/16/2024 1:27 AM EDT    Workstation ID: [id number]          PROCEDURES: none    Condition on Discharge:  stable    Physical Exam at Discharge  Vital Signs  Temp:  [97.7 °F (36.5 °C)-98.9 °F (37.2 °C)] 98.6 °F (37 °C)  Heart Rate:  [70-86] 70  Resp:  [18-22] 20  BP: (134-168)/(66-77) 134/69  Body mass index is 22.13 kg/m².    Physical Exam  Cardiovascular:      Rate and Rhythm: Normal rate and regular rhythm.   Pulmonary:      Effort: Pulmonary effort is normal.      Breath sounds: Normal breath sounds.   Abdominal:      General: Bowel sounds are normal.      Palpations: Abdomen is soft.   Neurological:      General: No focal deficit present.         Discharge Disposition  [facility]     Visiting Nurse:    As per facility    Home PT/OT:  As per facility    Home Safety Evaluation:  As per facility    DME  None new    Discharge Diet:      Dietary Orders (From admission, onward)       Start     Ordered    08/19/24 1200  Dietary Nutrition Supplements Boost Plus (Ensure Enlive, Ensure Plus); vanilla  Daily With Lunch & Dinner      Question Answer Comment   Select Supplement: Boost Plus (Ensure Enlive, Ensure Plus)    Flavor: vanilla        08/19/24 1028    08/16/24 0317  Diet: Regular/House; Fluid Consistency: Thin (IDDSI 0)  Diet Effective Now        References:    Diet Order [facility]   Question Answer Comment   Diets: Regular/House    Fluid Consistency: Thin (IDDSI 0)        08/16/24 0319                    Activity at Discharge:  As tolerated      Follow-up Appointments  No future appointments.  Additional Instructions for the Follow-ups that You Need to Schedule       Discharge Follow-up with PCP   As directed       Currently Documented PCP:     Karsten Caldera MD    PCP Phone Number:    [phone]     Follow Up Details: 1 week        Discharge Follow-up with Specified Provider: Cardiology; 2 Weeks   As directed      To: Cardiology   Follow Up: 2 Weeks                Test Results Pending at Discharge  Pending Labs       Order Current Status    Blood Culture - Blood, Arm, Left Preliminary result    Blood Culture - Blood, Arm, Right Preliminary result             Kaelyn Zheng, CODEY  08/20/24  00:22 EDT    Time: <30 minutes

## 2024-08-21 LAB
BACTERIA SPEC AEROBE CULT: NORMAL
BACTERIA SPEC AEROBE CULT: NORMAL

## 2024-08-21 NOTE — CASE MANAGEMENT/SOCIAL WORK
Case Management Discharge Note      Final Note: Discharged to [facility], skilled         Selected Continued Care - Discharged on 8/20/2024 Admission date: 8/16/2024 - Discharge disposition: Skilled Nursing Facility (DC - External)      Destination Coordination complete.      Service Provider Selected Services Address Phone Fax Patient Preferred    [facility] [address] [place] [address] [address] [id number] [phone] --              Durable Medical Equipment    No services have been selected for the patient.                Dialysis/Infusion    No services have been selected for the patient.                Home Medical Care    No services have been selected for the patient.                Therapy    No services have been selected for the patient.                Community Resources    No services have been selected for the patient.                Community & DME    No services have been selected for the patient.                         Final Discharge Disposition Code: 03 - skilled nursing facility (SNF)